# Patient Record
Sex: FEMALE | Employment: FULL TIME | URBAN - METROPOLITAN AREA
[De-identification: names, ages, dates, MRNs, and addresses within clinical notes are randomized per-mention and may not be internally consistent; named-entity substitution may affect disease eponyms.]

---

## 2021-04-26 ENCOUNTER — HOSPITAL ENCOUNTER (OUTPATIENT)
Dept: PHYSICAL THERAPY | Age: 31
Discharge: HOME | End: 2021-04-26
Payer: COMMERCIAL

## 2021-04-26 PROCEDURE — 97161 PT EVAL LOW COMPLEX 20 MIN: CPT | Performed by: PHYSICAL THERAPIST

## 2021-04-26 PROCEDURE — 97140 MANUAL THERAPY 1/> REGIONS: CPT | Performed by: PHYSICAL THERAPIST

## 2021-04-26 PROCEDURE — 97112 NEUROMUSCULAR REEDUCATION: CPT | Performed by: PHYSICAL THERAPIST
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
## 2021-05-04 ENCOUNTER — HOSPITAL ENCOUNTER (OUTPATIENT)
Dept: PHYSICAL THERAPY | Age: 31
Discharge: HOME | End: 2021-05-04
Payer: COMMERCIAL

## 2021-05-04 PROCEDURE — 97140 MANUAL THERAPY 1/> REGIONS: CPT | Performed by: PHYSICAL THERAPIST

## 2021-05-04 PROCEDURE — 97112 NEUROMUSCULAR REEDUCATION: CPT | Performed by: PHYSICAL THERAPIST

## 2021-05-04 NOTE — PROGRESS NOTES
PT DAILY TREATMENT NOTE - [facility] 2-15    Patient Name: Yury Jose  Date:2021  : 1990  [x]  Patient  Verified  Payor: Tabby Fuelling / Plan: [address] [place] [address] / Product Type: PPO /    In time:2:40 PM  Out time:340 PM  Total Treatment Time (min): 60  Total Timed Codes (min): 45  1:1 Treatment Time ( only): 45   Visit #:  2    Treatment Area: Headache with orthostatic component, not elsewhere classified [R51.0]    SUBJECTIVE  Pain Level (0-10 scale): 5-6/10  Any medication changes, allergies to medications, adverse drug reactions, diagnosis change, or new procedure performed?: [x] No    [] Yes (see summary sheet for update)  Subjective functional status/changes:   [] No changes reported  Pt reports exercise has been hard    OBJECTIVE    Modality rationale: decrease pain and increase tissue extensibility to improve the patients ability to sit, stand, transfer, ambulate, lift, carry, reach, complete ADLs   Min Type Additional Details       [] Estim: []Att   []Unatt    []TENS instruct                  []IFC  []Premod   []NMES                     []Other:  []w/US   []w/ice   []w/heat  Position:  Location:       []  Traction: [] Cervical       []Lumbar                       [] Prone          []Supine                       []Intermittent   []Continuous Lbs:  [] before manual  [] after manual  []w/heat    []  Ultrasound: []Continuous   [] Pulsed                       at: []1MHz   []3MHz Location:  W/cm2:    [] Paraffin         Location:   []w/heat   15 []  Ice     [x]  Heat  []  Ice massage Position: supine  Location: neck, back and chest    []  Laser  []  Other: Position:  Location:      []  Vasopneumatic Device Pressure:       [] lo [] med [] hi   Temperature:      [x] Skin assessment post-treatment:  [x]intact []redness- no adverse reaction    []redness  adverse reaction:     30 min Neuromuscular Re-education:  [x]  See flow sheet :   Rationale: improve coordination, improve balance and increase proprioception  to improve the patients ability to sit, stand, transfer, ambulate, lift, carry, reach, complete ADLs    15 min Manual Therapy: 2 person rib pumping, infraclavicular pumping    Rationale: decrease pain, increase ROM, increase tissue extensibility and decrease trigger points to improve the patients ability to sit, stand, transfer, ambulate, lift, carry, reach, complete ADLs         With   [] TE   [] TA   [x] Neuro   [] SC   [] other: Patient Education: [x] Review HEP    [] Progressed/Changed HEP based on:   [x] positioning   [x] body mechanics   [] transfers   [x] heat/ice application    [] other:      Other Objective/Functional Measures:   Difficulty disengaging rectus abdominus during today's interventions     Pain Level (0-10 scale) post treatment: 0    ASSESSMENT/Changes in Function:     Patient will continue to benefit from skilled PT services to modify and progress therapeutic interventions, address functional mobility deficits, address ROM deficits, address strength deficits, analyze and address soft tissue restrictions, analyze and cue movement patterns, analyze and modify body mechanics/ergonomics, assess and modify postural abnormalities, address imbalance/dizziness and instruct in home and community integration to attain remaining goals. []  See Plan of Care  []  See progress note/recertification  []  See Discharge Summary         Progress towards goals / Updated goals:  Patient continues to require verbal cues to complete exercises with correct form and postural awareness. Patient was able to advance several exercises this visit and is progressing well towards goals.     PLAN  [x]  Upgrade activities as tolerated     [x]  Continue plan of care  [x]  Update interventions per flow sheet       []  Discharge due to:_  []  Other:_      Jovany Altamirano, PT 5/4/2021

## 2021-05-11 ENCOUNTER — HOSPITAL ENCOUNTER (OUTPATIENT)
Dept: PHYSICAL THERAPY | Age: 31
Discharge: HOME | End: 2021-05-11
Payer: COMMERCIAL

## 2021-05-11 PROCEDURE — 97112 NEUROMUSCULAR REEDUCATION: CPT | Performed by: PHYSICAL THERAPIST

## 2021-05-11 NOTE — PROGRESS NOTES
PT DAILY TREATMENT NOTE - [facility] 2-15    Patient Name: Mike Choe  Date:2021  : 1990  [x]  Patient  Verified  Payor: AZEEM [place] / Plan: [address] [place] [address] / Product Type: PPO /    In time:9:00 AM Out time: 1015 AM  Total Treatment Time (min): 75  Total Timed Codes (min): 60  1:1 Treatment Time ( only): 60  Visit #:  3    Treatment Area: Headache with orthostatic component, not elsewhere classified [R51.0]    SUBJECTIVE  Pain Level (0-10 scale): 5/10  Any medication changes, allergies to medications, adverse drug reactions, diagnosis change, or new procedure performed?: [x] No    [] Yes (see summary sheet for update)  Subjective functional status/changes:   [] No changes reported  Pt reports she is no better and no worse but she is having an easier time breathing correctly    OBJECTIVE    Modality rationale: decrease pain and increase tissue extensibility to improve the patients ability to sit, stand, transfer, ambulate, lift, carry, reach, complete ADLs   Min Type Additional Details       [] Estim: []Att   []Unatt    []TENS instruct                  []IFC  []Premod   []NMES                     []Other:  []w/US   []w/ice   []w/heat  Position:  Location:       []  Traction: [] Cervical       []Lumbar                       [] Prone          []Supine                       []Intermittent   []Continuous Lbs:  [] before manual  [] after manual  []w/heat    []  Ultrasound: []Continuous   [] Pulsed                       at: []1MHz   []3MHz Location:  W/cm2:    [] Paraffin         Location:   []w/heat   15 []  Ice     [x]  Heat  []  Ice massage Position: supine  Location: neck, back and chest    []  Laser  []  Other: Position:  Location:      []  Vasopneumatic Device Pressure:       [] lo [] med [] hi   Temperature:      [x] Skin assessment post-treatment:  [x]intact []redness- no adverse reaction    []redness  adverse reaction:     60 min Neuromuscular Re-education:  [x]  See flow sheet : Rationale: improve coordination, improve balance and increase proprioception  to improve the patients ability to sit, stand, transfer, ambulate, lift, carry, reach, complete ADLs    0 min Manual Therapy: 2 person rib pumping, infraclavicular pumping    Rationale: decrease pain, increase ROM, increase tissue extensibility and decrease trigger points to improve the patients ability to sit, stand, transfer, ambulate, lift, carry, reach, complete ADLs         With   [] TE   [] TA   [x] Neuro   [] SC   [] other: Patient Education: [x] Review HEP    [] Progressed/Changed HEP based on:   [x] positioning   [x] body mechanics   [] transfers   [x] heat/ice application    [] other:      Other Objective/Functional Measures:   Slight increase in pain with inhalation with certain activities     Pain Level (0-10 scale) post treatment: 0    ASSESSMENT/Changes in Function:     Patient will continue to benefit from skilled PT services to modify and progress therapeutic interventions, address functional mobility deficits, address ROM deficits, address strength deficits, analyze and address soft tissue restrictions, analyze and cue movement patterns, analyze and modify body mechanics/ergonomics, assess and modify postural abnormalities, address imbalance/dizziness and instruct in home and community integration to attain remaining goals.      []  See Plan of Care  []  See progress note/recertification  []  See Discharge Summary         Progress towards goals / Updated goals:  Continued verbal and tactile cues needed to feel and sense IO/TA    PLAN  [x]  Upgrade activities as tolerated     [x]  Continue plan of care  [x]  Update interventions per flow sheet       []  Discharge due to:_  []  Other:_      Ever Killer, PT 5/11/2021

## 2021-05-18 ENCOUNTER — HOSPITAL ENCOUNTER (OUTPATIENT)
Dept: PHYSICAL THERAPY | Age: 31
Discharge: HOME | End: 2021-05-18
Payer: COMMERCIAL

## 2021-05-18 PROCEDURE — 97112 NEUROMUSCULAR REEDUCATION: CPT | Performed by: PHYSICAL THERAPIST

## 2021-05-18 NOTE — PROGRESS NOTES
PT DAILY TREATMENT NOTE - [facility] 2-15    Patient Name: Cody Bello  Date:2021  : 1990  [x]  Patient  Verified  Payor: AZEEM NHUNG / Plan: [address] [place] [address] / Product Type: PPO /    In time:9:00 AM Out time: 1015 AM  Total Treatment Time (min): 75  Total Timed Codes (min): 60  1:1 Treatment Time ( only): 60  Visit #:  4    Treatment Area: Headache with orthostatic component, not elsewhere classified [R51.0]    SUBJECTIVE  Pain Level (0-10 scale): 5/10  Any medication changes, allergies to medications, adverse drug reactions, diagnosis change, or new procedure performed?: [x] No    [] Yes (see summary sheet for update)  Subjective functional status/changes:   [] No changes reported  Pt reports she has been busy the last few days 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[employer] and has had limited time to perform HEP    OBJECTIVE    Modality rationale: decrease pain and increase tissue extensibility to improve the patients ability to sit, stand, transfer, ambulate, lift, carry, reach, complete ADLs   Min Type Additional Details       [] Estim: []Att   []Unatt    []TENS instruct                  []IFC  []Premod   []NMES                     []Other:  []w/US   []w/ice   []w/heat  Position:  Location:       []  Traction: [] Cervical       []Lumbar                       [] Prone          []Supine                       []Intermittent   []Continuous Lbs:  [] before manual  [] after manual  []w/heat    []  Ultrasound: []Continuous   [] Pulsed                       at: []1MHz   []3MHz Location:  W/cm2:    [] Paraffin         Location:   []w/heat   15 []  Ice     [x]  Heat  []  Ice massage Position: supine  Location: neck, back and chest    []  Laser  []  Other: Position:  Location:      []  Vasopneumatic Device Pressure:       [] lo [] med [] hi   Temperature:      [x] Skin assessment post-treatment:  [x]intact []redness- no adverse reaction    []redness  adverse reaction:     45 min Neuromuscular Re-education:  [x] See flow sheet :   Rationale: improve coordination, improve balance and increase proprioception  to improve the patients ability to sit, stand, transfer, ambulate, lift, carry, reach, complete ADLs    0 min Manual Therapy: 2 person rib pumping, infraclavicular pumping    Rationale: decrease pain, increase ROM, increase tissue extensibility and decrease trigger points to improve the patients ability to sit, stand, transfer, ambulate, lift, carry, reach, complete ADLs         With   [] TE   [] TA   [x] Neuro   [] SC   [] other: Patient Education: [x] Review HEP    [] Progressed/Changed HEP based on:   [x] positioning   [x] body mechanics   [] transfers   [x] heat/ice application    [] other:      Other Objective/Functional Measures:   No increase in pain with today's interventions     Pain Level (0-10 scale) post treatment: 0    ASSESSMENT/Changes in Function:     Patient will continue to benefit from skilled PT services to modify and progress therapeutic interventions, address functional mobility deficits, address ROM deficits, address strength deficits, analyze and address soft tissue restrictions, analyze and cue movement patterns, analyze and modify body mechanics/ergonomics, assess and modify postural abnormalities, address imbalance/dizziness and instruct in home and community integration to attain remaining goals. []  See Plan of Care  []  See progress note/recertification  []  See Discharge Summary         Progress towards goals / Updated goals:  Advanced HEP.     PLAN  [x]  Upgrade activities as tolerated     [x]  Continue plan of care  [x]  Update interventions per flow sheet       []  Discharge due to:_  []  Other:_      Maryuri Bob, PT 5/18/2021

## 2021-05-25 ENCOUNTER — HOSPITAL ENCOUNTER (OUTPATIENT)
Dept: PHYSICAL THERAPY | Age: 31
Discharge: HOME | End: 2021-05-25
Payer: COMMERCIAL

## 2021-05-25 PROCEDURE — 97112 NEUROMUSCULAR REEDUCATION: CPT | Performed by: PHYSICAL THERAPIST

## 2021-05-25 NOTE — PROGRESS NOTES
Physical Therapy at [facility],   a part of [address]  [place] [facility], [address]  Phone: [phone]      Fax:  [phone]    Progress Note    Name: Malik Carmona   : 1990   MD: Kavin Vuong MD       Treatment Diagnosis: Headache with orthostatic component, not elsewhere classified [R51.0]  Start of Care: 21    Visits from Start of Care: 5  Missed Visits: 0    Summary of Care: Therapeutic Exercise,  Manual Therapy, Neuromuscular Re-education, Patient Education, Home Exercise Program         Assessment / Recommendations: The patient has completed 5 PT visits and has met 2/3 short term goals. The patient's progress has been slow due to stress of finishing her school semester. The patient would benefit from continued PT 1x/week for up to 8 additional weeks to improve breathing mechanics, restore strength without overstressing rectus abdominus, diaphragm or intercostals, and improve sensory awareness of position to reduce joint stress and pain. Short Term Goals: To be accomplished in 4 weeks:  1) The patient will be independent with introductory HEP - MET  2) The patient will demonstrate negative PADT to indicate neutral pelvic position - NOT MET  3) The patient will report ability to sit at her desk for >1 hour without a significant increase in symptoms - MET  Long Term Goals:  To be accomplished in 12 weeks:  1) The patient will demonstrate L3 squat or greater to improve ease with household chores - NOT MET  2) The patient will demonstrate 90 deg shoulder IR PROM B to indicate improved scapular-thoracic position- NOT MET  3) The patient will report ability to walk up and down inclines without an increase in pain for 30 minutes or greater - NOT MET    Drea Morrison, PT 2021

## 2021-06-01 ENCOUNTER — HOSPITAL ENCOUNTER (OUTPATIENT)
Dept: PHYSICAL THERAPY | Age: 31
Discharge: HOME | End: 2021-06-01
Payer: COMMERCIAL

## 2021-06-01 PROCEDURE — 97112 NEUROMUSCULAR REEDUCATION: CPT | Performed by: PHYSICAL THERAPIST

## 2021-06-01 PROCEDURE — 97140 MANUAL THERAPY 1/> REGIONS: CPT | Performed by: PHYSICAL THERAPIST

## 2021-06-01 NOTE — PROGRESS NOTES
PT DAILY TREATMENT NOTE - [facility] 2-15    Patient Name: Ross Montes De Oca  Date:2021  : 1990  [x]  Patient  Verified  Payor: BLUE CROSS / Plan: [address] [place] [address] / Product Type: PPO /    In time: 355 PM Out time: 4:55 PM  Total Treatment Time (min): 55  Total Timed Codes (min): 45  1:1 Treatment Time ( only): 40  Visit #:  6    Treatment Area: Headache with orthostatic component, not elsewhere classified [R51.0]    SUBJECTIVE  Pain Level (0-10 scale): 5/10  Any medication changes, allergies to medications, adverse drug reactions, diagnosis change, or new procedure performed?: [x] No    [] Yes (see summary sheet for update)  Subjective functional status/changes:   [] No changes reported  Pt reports her headaches have been worse and she doesn't know why    OBJECTIVE    Modality rationale: decrease pain and increase tissue extensibility to improve the patients ability to sit, stand, transfer, ambulate, lift, carry, reach, complete ADLs   Min Type Additional Details       [] Estim: []Att   []Unatt    []TENS instruct                  []IFC  []Premod   []NMES                     []Other:  []w/US   []w/ice   []w/heat  Position:  Location:       []  Traction: [] Cervical       []Lumbar                       [] Prone          []Supine                       []Intermittent   []Continuous Lbs:  [] before manual  [] after manual  []w/heat    []  Ultrasound: []Continuous   [] Pulsed                       at: []1MHz   []3MHz Location:  W/cm2:    [] Paraffin         Location:   []w/heat   15 []  Ice     [x]  Heat  []  Ice massage Position: supine  Location: neck, back and chest    []  Laser  []  Other: Position:  Location:      []  Vasopneumatic Device Pressure:       [] lo [] med [] hi   Temperature:      [x] Skin assessment post-treatment:  [x]intact []redness- no adverse reaction    []redness  adverse reaction:     30 min Neuromuscular Re-education:  [x]  See flow sheet :   Rationale: improve coordination, improve balance and increase proprioception  to improve the patients ability to sit, stand, transfer, ambulate, lift, carry, reach, complete ADLs    15 min Manual Therapy:  rib pumping, infraclavicular pumping, L AIC technique    Rationale: decrease pain, increase ROM, increase tissue extensibility and decrease trigger points to improve the patients ability to sit, stand, transfer, ambulate, lift, carry, reach, complete ADLs         With   [] TE   [] TA   [x] Neuro   [] SC   [] other: Patient Education: [x] Review HEP    [] Progressed/Changed HEP based on:   [x] positioning   [x] body mechanics   [] transfers   [x] heat/ice application    [] other:      Other Objective/Functional Measures:   Limited jaw excursion left  Pain relief with sacro sphenoid activity    Pain Level (0-10 scale) post treatment: 0    ASSESSMENT/Changes in Function:     Patient will continue to benefit from skilled PT services to modify and progress therapeutic interventions, address functional mobility deficits, address ROM deficits, address strength deficits, analyze and address soft tissue restrictions, analyze and cue movement patterns, analyze and modify body mechanics/ergonomics, assess and modify postural abnormalities, address imbalance/dizziness and instruct in home and community integration to attain remaining goals.      []  See Plan of Care  [x]  See progress note/recertification  []  See Discharge Summary         Progress towards goals / Updated goals:  Discontinued balloon due to increase in HA pain  Ecouraged using towel roll and pillow to keep neck relax for improve cervical spine relaxation during activities  PLAN  [x]  Upgrade activities as tolerated     [x]  Continue plan of care  [x]  Update interventions per flow sheet       []  Discharge due to:_  []  Other:_      Nolan Potts, PT 6/1/2021

## 2021-06-14 ENCOUNTER — APPOINTMENT (OUTPATIENT)
Dept: PHYSICAL THERAPY | Age: 31
End: 2021-06-14
Payer: COMMERCIAL

## 2021-06-15 ENCOUNTER — APPOINTMENT (OUTPATIENT)
Dept: PHYSICAL THERAPY | Age: 31
End: 2021-06-15
Payer: COMMERCIAL

## 2021-06-22 ENCOUNTER — HOSPITAL ENCOUNTER (OUTPATIENT)
Dept: PHYSICAL THERAPY | Age: 31
Discharge: HOME | End: 2021-06-22
Payer: COMMERCIAL

## 2021-06-22 PROCEDURE — 97112 NEUROMUSCULAR REEDUCATION: CPT | Performed by: PHYSICAL THERAPIST

## 2021-06-22 PROCEDURE — 97140 MANUAL THERAPY 1/> REGIONS: CPT | Performed by: PHYSICAL THERAPIST

## 2021-06-22 NOTE — PROGRESS NOTES
PT DAILY TREATMENT NOTE - [facility] 2-15    Patient Name: Mckenzie Pinzon  Date:2021  : 1990  [x]  Patient  Verified  Payor: BLUE CROSS / Plan: [address] [place] [address] / Product Type: PPO /    In time: 100 PM Out time: 210 PM  Total Treatment Time (min): 70  Total Timed Codes (min): 60  1:1 Treatment Time ( only): 60  Visit #:  7    Treatment Area: Headache with orthostatic component, not elsewhere classified [R51.0]    SUBJECTIVE  Pain Level (0-10 scale): 4-5/10  Any medication changes, allergies to medications, adverse drug reactions, diagnosis change, or new procedure performed?: [x] No    [] Yes (see summary sheet for update)  Subjective functional status/changes:   [] No changes reported  Pt reports on  she went on a 4 mile walk and she paid for it   [employer] down and icing help\"  \"A normal walk at this time is just around the block 15-20 min\"      OBJECTIVE    Modality rationale: decrease pain and increase tissue extensibility to improve the patients ability to sit, stand, transfer, ambulate, lift, carry, reach, complete ADLs   Min Type Additional Details       [] Estim: []Att   []Unatt    []TENS instruct                  []IFC  []Premod   []NMES                     []Other:  []w/US   []w/ice   []w/heat  Position:  Location:       []  Traction: [] Cervical       []Lumbar                       [] Prone          []Supine                       []Intermittent   []Continuous Lbs:  [] before manual  [] after manual  []w/heat    []  Ultrasound: []Continuous   [] Pulsed                       at: []1MHz   []3MHz Location:  W/cm2:    [] Paraffin         Location:   []w/heat   10 []  Ice     [x]  Heat  []  Ice massage Position: supine  Location: neck, back and chest    []  Laser  []  Other: Position:  Location:      []  Vasopneumatic Device Pressure:       [] lo [] med [] hi   Temperature:      [x] Skin assessment post-treatment:  [x]intact []redness- no adverse reaction    []redness  adverse reaction:     45 min Neuromuscular Re-education:  [x]  See flow sheet :   Rationale: improve coordination, improve balance and increase proprioception  to improve the patients ability to sit, stand, transfer, ambulate, lift, carry, reach, complete ADLs    15 min Manual Therapy:  rib pumping, infraclavicular pumping, L AIC technique    Rationale: decrease pain, increase ROM, increase tissue extensibility and decrease trigger points to improve the patients ability to sit, stand, transfer, ambulate, lift, carry, reach, complete ADLs         With   [] TE   [] TA   [x] Neuro   [] SC   [] other: Patient Education: [x] Review HEP    [] Progressed/Changed HEP based on:   [x] positioning   [x] body mechanics   [] transfers   [x] heat/ice application    [] other:      Other Objective/Functional Measures:   HG IR 45 deg B  HG flex 170  HAdDT -R  Post med exp limited B  HAdDT - B  SLR 90 deg  LTR WNL    Pain Level (0-10 scale) post treatment: 0    ASSESSMENT/Changes in Function:     Patient will continue to benefit from skilled PT services to modify and progress therapeutic interventions, address functional mobility deficits, address ROM deficits, address strength deficits, analyze and address soft tissue restrictions, analyze and cue movement patterns, analyze and modify body mechanics/ergonomics, assess and modify postural abnormalities, address imbalance/dizziness and instruct in home and community integration to attain remaining goals. []  See Plan of Care  []  See progress note/recertification  []  See Discharge Summary         Progress towards goals / Updated goals:  BC chain inhibited with today's interventions. Significant work to address tongue position.     PLAN   [x]  Upgrade activities as tolerated     [x]  Continue plan of care  [x]  Update interventions per flow sheet       []  Discharge due to:_  []  Other:_      Nisha Found, PT 6/22/2021

## 2021-06-29 ENCOUNTER — HOSPITAL ENCOUNTER (OUTPATIENT)
Dept: PHYSICAL THERAPY | Age: 31
Discharge: HOME | End: 2021-06-29
Payer: COMMERCIAL

## 2021-06-29 PROCEDURE — 97112 NEUROMUSCULAR REEDUCATION: CPT | Performed by: PHYSICAL THERAPIST

## 2021-06-29 PROCEDURE — 97140 MANUAL THERAPY 1/> REGIONS: CPT | Performed by: PHYSICAL THERAPIST

## 2021-06-29 NOTE — PROGRESS NOTES
PT DAILY TREATMENT NOTE - [facility] 2-15    Patient Name: Palmira Martinez  Date:2021  : 1990  [x]  Patient  Verified  Payor: BLUE CROSS / Plan: [address] [place] [address] / Product Type: PPO /    In time: 5121 PM Out time: 155 PM  Total Treatment Time (min): 80  Total Timed Codes (min): 65  1:1 Treatment Time ( only): 72  Visit #:  8    Treatment Area: Headache with orthostatic component, not elsewhere classified [R51.0]    SUBJECTIVE  Pain Level (0-10 scale): 4-5/10  Any medication changes, allergies to medications, adverse drug reactions, diagnosis change, or new procedure performed?: [x] No    [] Yes (see summary sheet for update)  Subjective functional status/changes:   [] No changes reported  Pt reports she had back neck pain for 4 days and she doesn't know why (L sided)      OBJECTIVE    Modality rationale: decrease pain and increase tissue extensibility to improve the patients ability to sit, stand, transfer, ambulate, lift, carry, reach, complete ADLs   Min Type Additional Details       [] Estim: []Att   []Unatt    []TENS instruct                  []IFC  []Premod   []NMES                     []Other:  []w/US   []w/ice   []w/heat  Position:  Location:       []  Traction: [] Cervical       []Lumbar                       [] Prone          []Supine                       []Intermittent   []Continuous Lbs:  [] before manual  [] after manual  []w/heat    []  Ultrasound: []Continuous   [] Pulsed                       at: []1MHz   []3MHz Location:  W/cm2:    [] Paraffin         Location:   []w/heat   15 []  Ice     [x]  Heat  []  Ice massage Position: supine  Location: neck, back and chest    []  Laser  []  Other: Position:  Location:      []  Vasopneumatic Device Pressure:       [] lo [] med [] hi   Temperature:      [x] Skin assessment post-treatment:  [x]intact []redness- no adverse reaction    []redness  adverse reaction:     30 min Neuromuscular Re-education:  [x]  See flow sheet :   Rationale: improve coordination, improve balance and increase proprioception  to improve the patients ability to sit, stand, transfer, ambulate, lift, carry, reach, complete ADLs    30 min Manual Therapy:  rib pumping, infraclavicular pumping, L AIC technique, 3 person central diaphragm release, MFR to diaphragm and rectus abs    Rationale: decrease pain, increase ROM, increase tissue extensibility and decrease trigger points to improve the patients ability to sit, stand, transfer, ambulate, lift, carry, reach, complete ADLs         With   [] TE   [] TA   [x] Neuro   [] SC   [] other: Patient Education: [x] Review HEP    [] Progressed/Changed HEP based on:   [x] positioning   [x] body mechanics   [] transfers   [x] heat/ice application    [] other:      Other Objective/Functional Measures:   Pt able to hold all 4 pos without an increase in neck pain    Pain Level (0-10 scale) post treatment: 0    ASSESSMENT/Changes in Function:     Patient will continue to benefit from skilled PT services to modify and progress therapeutic interventions, address functional mobility deficits, address ROM deficits, address strength deficits, analyze and address soft tissue restrictions, analyze and cue movement patterns, analyze and modify body mechanics/ergonomics, assess and modify postural abnormalities, address imbalance/dizziness and instruct in home and community integration to attain remaining goals.      []  See Plan of Care  []  See progress note/recertification  []  See Discharge Summary         Progress towards goals / Updated goals:  Improved tolerance of quadruped position    PLAN   [x]  Upgrade activities as tolerated     [x]  Continue plan of care  [x]  Update interventions per flow sheet       []  Discharge due to:_  []  Other:_      Amada Gaxiola, PT 6/29/2021

## 2021-07-08 ENCOUNTER — HOSPITAL ENCOUNTER (OUTPATIENT)
Dept: PHYSICAL THERAPY | Age: 31
Discharge: HOME | End: 2021-07-08
Payer: COMMERCIAL

## 2021-07-08 PROCEDURE — 97112 NEUROMUSCULAR REEDUCATION: CPT | Performed by: PHYSICAL THERAPIST

## 2021-07-08 PROCEDURE — 97140 MANUAL THERAPY 1/> REGIONS: CPT | Performed by: PHYSICAL THERAPIST

## 2021-07-08 NOTE — PROGRESS NOTES
PT DAILY TREATMENT NOTE - [facility] 2-15    Patient Name: Porter Harris  Date:2021  : 1990  [x]  Patient  Verified  Payor: BLUE CROSS / Plan: [address] [place] [address] / Product Type: PPO /    In time: 230 PM Out time: 445 PM  Total Treatment Time (min): 75  Total Timed Codes (min): 60  1:1 Treatment Time (MC only): 60  Visit #:  9    Treatment Area: Headache with orthostatic component, not elsewhere classified [R51.0]    SUBJECTIVE  Pain Level (0-10 scale): 5/10  Any medication changes, allergies to medications, adverse drug reactions, diagnosis change, or new procedure performed?: [x] No    [] Yes (see summary sheet for update)  Subjective functional status/changes:   [] No changes reported  Pt reports she is having trouble with the downward dog   Pt reports she has been under more stress from work since one person quit  Pt reports headaches have remained unchanged  Pt reports she has some L sided neck tension after sacrospheno flexion  Pt reports she is scheduled to try cupping and abdominal massage in the next 2 weeks        OBJECTIVE    Modality rationale: decrease pain and increase tissue extensibility to improve the patients ability to sit, stand, transfer, ambulate, lift, carry, reach, complete ADLs   Min Type Additional Details       [] Estim: []Att   []Unatt    []TENS instruct                  []IFC  []Premod   []NMES                     []Other:  []w/US   []w/ice   []w/heat  Position:  Location:       []  Traction: [] Cervical       []Lumbar                       [] Prone          []Supine                       []Intermittent   []Continuous Lbs:  [] before manual  [] after manual  []w/heat    []  Ultrasound: []Continuous   [] Pulsed                       at: []1MHz   []3MHz Location:  W/cm2:    [] Paraffin         Location:   []w/heat   15 []  Ice     [x]  Heat  []  Ice massage Position: supine  Location: neck, back and chest    []  Laser  []  Other: Position:  Location:      [] Vasopneumatic Device Pressure:       [] lo [] med [] hi   Temperature:      [x] Skin assessment post-treatment:  [x]intact []redness- no adverse reaction    []redness  adverse reaction:     30 min Neuromuscular Re-education:  [x]  See flow sheet :   Rationale: improve coordination, improve balance and increase proprioception  to improve the patients ability to sit, stand, transfer, ambulate, lift, carry, reach, complete ADLs    30 min Manual Therapy:  rib pumping, infraclavicular pumping, L AIC technique, 2 person central diaphragm release, MFR to diaphragm and rectus abs (L), MFR to R QL and QL stretching   Rationale: decrease pain, increase ROM, increase tissue extensibility and decrease trigger points to improve the patients ability to sit, stand, transfer, ambulate, lift, carry, reach, complete ADLs         With   [] TE   [] TA   [x] Neuro   [] SC   [] other: Patient Education: [x] Review HEP    [] Progressed/Changed HEP based on:   [x] positioning   [x] body mechanics   [] transfers   [x] heat/ice application    [] other:      Other Objective/Functional Measures:   Pt with excessive cervical extension with sacro spheno flexion so verbal cues were given to perform activity with correct form. Pain with standing glute push and standing apical expansion exercise  No pain with Late LAIC stance with R arm reach activity    Pain Level (0-10 scale) post treatment: 0    ASSESSMENT/Changes in Function:     Patient will continue to benefit from skilled PT services to modify and progress therapeutic interventions, address functional mobility deficits, address ROM deficits, address strength deficits, analyze and address soft tissue restrictions, analyze and cue movement patterns, analyze and modify body mechanics/ergonomics, assess and modify postural abnormalities, address imbalance/dizziness and instruct in home and community integration to attain remaining goals.      []  See Plan of Care  []  See progress note/recertification  []  See Discharge Summary         Progress towards goals / Updated goals:  Adjusted HEP due to painful downward dog. Assigned new jaw activities and new R QL stretch and instructed in taping lips at night.     PLAN   [x]  Upgrade activities as tolerated     [x]  Continue plan of care  [x]  Update interventions per flow sheet       []  Discharge due to:_  []  Other:_      Rayray Gurrola, PT 7/8/2021

## 2021-07-13 ENCOUNTER — HOSPITAL ENCOUNTER (OUTPATIENT)
Dept: PHYSICAL THERAPY | Age: 31
Discharge: HOME | End: 2021-07-13
Payer: COMMERCIAL

## 2021-07-13 PROCEDURE — 97112 NEUROMUSCULAR REEDUCATION: CPT | Performed by: PHYSICAL THERAPIST

## 2021-07-13 PROCEDURE — 97140 MANUAL THERAPY 1/> REGIONS: CPT | Performed by: PHYSICAL THERAPIST

## 2021-07-13 NOTE — PROGRESS NOTES
PT DAILY TREATMENT NOTE - [facility] 2-15    Patient Name: Luis Mcnamara  Date:2021  : 1990  [x]  Patient  Verified  Payor: AZEEM NHUNG / Plan: [address] [place] [address] / Product Type: PPO /    In time: 1135 AM Out time: 1250 AM  Total Treatment Time (min): 75  Total Timed Codes (min): 60  1:1 Treatment Time ( only): 55  Visit #:  10    Treatment Area: Headache with orthostatic component, not elsewhere classified [R51.0]    SUBJECTIVE  Pain Level (0-10 scale): 5/10  Any medication changes, allergies to medications, adverse drug reactions, diagnosis change, or new procedure performed?: [x] No    [] Yes (see summary sheet for update)  Subjective functional status/changes:   [] No changes reported  Pt reports she stubbed her R pinky toe on Saturday on her bed. X-rays revealed no fracture.   PT reports compliance with HEP      OBJECTIVE    Modality rationale: decrease pain and increase tissue extensibility to improve the patients ability to sit, stand, transfer, ambulate, lift, carry, reach, complete ADLs   Min Type Additional Details       [] Estim: []Att   []Unatt    []TENS instruct                  []IFC  []Premod   []NMES                     []Other:  []w/US   []w/ice   []w/heat  Position:  Location:       []  Traction: [] Cervical       []Lumbar                       [] Prone          []Supine                       []Intermittent   []Continuous Lbs:  [] before manual  [] after manual  []w/heat    []  Ultrasound: []Continuous   [] Pulsed                       at: []1MHz   []3MHz Location:  W/cm2:    [] Paraffin         Location:   []w/heat   15 []  Ice     [x]  Heat  []  Ice massage Position: supine  Location: neck, back and chest    []  Laser  []  Other: Position:  Location:      []  Vasopneumatic Device Pressure:       [] lo [] med [] hi   Temperature:      [x] Skin assessment post-treatment:  [x]intact []redness- no adverse reaction    []redness  adverse reaction:     30 min Neuromuscular Re-education:  [x]  See flow sheet :   Rationale: improve coordination, improve balance and increase proprioception  to improve the patients ability to sit, stand, transfer, ambulate, lift, carry, reach, complete ADLs    30 min Manual Therapy:  rib pumping, infraclavicular pumping, L AIC technique, 2 person central diaphragm release, MFR to diaphragm and rectus abs (L), MFR to R QL and QL stretching. Rationale: decrease pain, increase ROM, increase tissue extensibility and decrease trigger points to improve the patients ability to sit, stand, transfer, ambulate, lift, carry, reach, complete ADLs         With   [] TE   [] TA   [x] Neuro   [] SC   [] other: Patient Education: [x] Review HEP    [] Progressed/Changed HEP based on:   [x] positioning   [x] body mechanics   [] transfers   [x] heat/ice application    [] other:      Other Objective/Functional Measures:   Pain with R lat stretch and R lat hang so this exercise was not assigned for HEP    Late L AIC stance performed on pillows for increased sensation through feet. Pain Level (0-10 scale) post treatment: 0    ASSESSMENT/Changes in Function:     Patient will continue to benefit from skilled PT services to modify and progress therapeutic interventions, address functional mobility deficits, address ROM deficits, address strength deficits, analyze and address soft tissue restrictions, analyze and cue movement patterns, analyze and modify body mechanics/ergonomics, assess and modify postural abnormalities, address imbalance/dizziness and instruct in home and community integration to attain remaining goals. []  See Plan of Care  []  See progress note/recertification  []  See Discharge Summary         Progress towards goals / Updated goals:  Given new breathing activities and a vagus nerve educational sheet. Pt limited by R toe pain.     PLAN   [x]  Upgrade activities as tolerated     [x]  Continue plan of care  [x]  Update interventions per flow sheet []  Discharge due to:_  []  Other:_      Giulia Sepulveda, PT 7/13/2021

## 2021-07-20 ENCOUNTER — HOSPITAL ENCOUNTER (OUTPATIENT)
Dept: PHYSICAL THERAPY | Age: 31
Discharge: HOME | End: 2021-07-20
Payer: COMMERCIAL

## 2021-07-20 PROCEDURE — 97112 NEUROMUSCULAR REEDUCATION: CPT | Performed by: PHYSICAL THERAPIST

## 2021-07-20 PROCEDURE — 97140 MANUAL THERAPY 1/> REGIONS: CPT | Performed by: PHYSICAL THERAPIST

## 2021-07-20 NOTE — PROGRESS NOTES
PT DAILY TREATMENT NOTE - [facility] 2-15    Patient Name: Luis Mcnamara  Date:2021  : 1990  [x]  Patient  Verified  Payor: BLUE CROSS / Plan: [address] [place] [address] / Product Type: PPO /    In time: 240 PM Out time: 355 PM  Total Treatment Time (min): 75  Total Timed Codes (min): 60  1:1 Treatment Time ( only): 60  Visit #:  11    Treatment Area: Headache with orthostatic component, not elsewhere classified [R51.0]    SUBJECTIVE  Pain Level (0-10 scale): 5/10  Any medication changes, allergies to medications, adverse drug reactions, diagnosis change, or new procedure performed?: [x] No    [] Yes (see summary sheet for update)  Subjective functional status/changes:   [] No changes reported  Pt reports she had a cupping session \"I felt less restricted waking up\"  Pt has been recommended to go to [facility]   Pt feels fatigued after her PT session  Pt has had a stressful day she found out her dog has cancer    OBJECTIVE    Modality rationale: decrease pain and increase tissue extensibility to improve the patients ability to sit, stand, transfer, ambulate, lift, carry, reach, complete ADLs   Min Type Additional Details       [] Estim: []Att   []Unatt    []TENS instruct                  []IFC  []Premod   []NMES                     []Other:  []w/US   []w/ice   []w/heat  Position:  Location:       []  Traction: [] Cervical       []Lumbar                       [] Prone          []Supine                       []Intermittent   []Continuous Lbs:  [] before manual  [] after manual  []w/heat    []  Ultrasound: []Continuous   [] Pulsed                       at: []1MHz   []3MHz Location:  W/cm2:    [] Paraffin         Location:   []w/heat   15 []  Ice     [x]  Heat  []  Ice massage Position: supine  Location: neck, back and chest    []  Laser  []  Other: Position:  Location:      []  Vasopneumatic Device Pressure:       [] lo [] med [] hi   Temperature:      [x] Skin assessment post-treatment: [x]intact []redness- no adverse reaction    []redness  adverse reaction:     45 min Neuromuscular Re-education:  [x]  See flow sheet :   Rationale: improve coordination, improve balance and increase proprioception  to improve the patients ability to sit, stand, transfer, ambulate, lift, carry, reach, complete ADLs    15 min Manual Therapy:  rib pumping, infraclavicular pumping, L AIC technique, 2 person central diaphragm release, MFR to diaphragm and rectus abs (L), MFR to R QL and QL stretching. Rationale: decrease pain, increase ROM, increase tissue extensibility and decrease trigger points to improve the patients ability to sit, stand, transfer, ambulate, lift, carry, reach, complete ADLs         With   [] TE   [] TA   [x] Neuro   [] SC   [] other: Patient Education: [x] Review HEP    [] Progressed/Changed HEP based on:   [x] positioning   [x] body mechanics   [] transfers   [x] heat/ice application    [] other:      Other Objective/Functional Measures:   Difficulty disengaging recuts abs during exhale so hum was used instead, encouraged use of straw at home    Pain Level (0-10 scale) post treatment: 0    ASSESSMENT/Changes in Function:     Patient will continue to benefit from skilled PT services to modify and progress therapeutic interventions, address functional mobility deficits, address ROM deficits, address strength deficits, analyze and address soft tissue restrictions, analyze and cue movement patterns, analyze and modify body mechanics/ergonomics, assess and modify postural abnormalities, address imbalance/dizziness and instruct in home and community integration to attain remaining goals. []  See Plan of Care  []  See progress note/recertification  []  See Discharge Summary         Progress towards goals / Updated goals:  Used hum during activities for improved sense of L IO/TA. Added hyperboloid activity.     PLAN   [x]  Upgrade activities as tolerated     [x]  Continue plan of care  [x]  Update interventions per flow sheet       []  Discharge due to:_  []  Other:_      Francie Gilbert, PT 7/20/2021

## 2021-07-28 ENCOUNTER — HOSPITAL ENCOUNTER (OUTPATIENT)
Dept: PHYSICAL THERAPY | Age: 31
Discharge: HOME | End: 2021-07-28
Payer: COMMERCIAL

## 2021-07-28 PROCEDURE — 97535 SELF CARE MNGMENT TRAINING: CPT | Performed by: PHYSICAL THERAPIST

## 2021-07-28 PROCEDURE — 97140 MANUAL THERAPY 1/> REGIONS: CPT | Performed by: PHYSICAL THERAPIST

## 2021-07-28 PROCEDURE — 97112 NEUROMUSCULAR REEDUCATION: CPT | Performed by: PHYSICAL THERAPIST

## 2021-07-28 NOTE — PROGRESS NOTES
Physical Therapy at [facility],   a part of  [facility]  [address] [facility], [facility]  Phone: [phone]      Fax:  [phone]    Progress Note and Updated POC    Name: Prateek Fuentes   : 1990   MD: Los Powers MD       Treatment Diagnosis: Headache with orthostatic component, not elsewhere classified [R51.0]  Start of Care: 21    Visits from Start of Care: 12  Missed Visits: 0    Summary of Care: Therapeutic Exercise,  Manual Therapy, Neuromuscular Re-education, Patient Education, Home Exercise Program         Assessment / Recommendations: The patient has completed 12 PT visits and has met 2/3 short term goals and 1/3 long term goals. The patient continues to report 5/10 levels of pain in her neck and abdomen and continues to be limited by headaches. Pain fluctuates and is worsened with stress and increased physical exertion. The patient recently saw a chiropractor an experienced an increase in HA pain. The patient has tried massage and cupping with short term benefits. The patient reports improved pain with pursed lip slow breathing, opposed to forceful exhale. The patient would benefit from continued PT 1x/week for up to 8 additional weeks to improve breathing mechanics, restore strength without overstressing rectus abdominus, diaphragm or intercostals, and improve sensory awareness of position to reduce joint stress and pain. Short Term Goals: To be accomplished in 4 weeks:  1) The patient will be independent with introductory HEP - MET  2) The patient will demonstrate negative PADT to indicate neutral pelvic position - NOT MET  3) The patient will report ability to sit at her desk for >1 hour without a significant increase in symptoms - MET  Long Term Goals:  To be accomplished in 12 weeks: Update to 24 visits:  1) The patient will demonstrate L3 squat or greater to improve ease with household chores - NOT MET  2) The patient will demonstrate 90 deg shoulder IR PROM B to indicate improved scapular-thoracic position- MET  3) The patient will report ability to walk up and down inclines without an increase in pain for 30 minutes or greater - NOT MET  New Goal:  1) The patient will report ability to sit for 4 hours to improve ability to complete a 1/2 day of work    Jaqueline Owen, PT 7/28/2021

## 2021-07-28 NOTE — PROGRESS NOTES
PT DAILY TREATMENT NOTE - [facility] -15    Patient Name: Zeferino Argueta  Date:2021  : 1990  [x]  Patient  Verified  Payor: BLUE CROSS / Plan: [address] [place] [address] / Product Type: PPO /    In time: 11:05 AM  Out time: 12:00 PM  Total Treatment Time (min): 55  Total Timed Codes (min): 55  1:1 Treatment Time ( only): 54  Visit #:  12    Treatment Area: Headache with orthostatic component, not elsewhere classified [R51.0]    SUBJECTIVE  Pain Level (0-10 scale): 5/10  Any medication changes, allergies to medications, adverse drug reactions, diagnosis change, or new procedure performed?: [x] No    [] Yes (see summary sheet for update)  Subjective functional status/changes:   [] No changes reported  Pt reports she has been noticing with her jaw exercise, shifting to the right she has some L ear popping  Pt reports she tried to see the chiropractor and her neck was looser but the next day she had a bad headache    OBJECTIVE    Modality rationale: decrease pain and increase tissue extensibility to improve the patients ability to sit, stand, transfer, ambulate, lift, carry, reach, complete ADLs   Min Type Additional Details       [] Estim: []Att   []Unatt    []TENS instruct                  []IFC  []Premod   []NMES                     []Other:  []w/US   []w/ice   []w/heat  Position:  Location:       []  Traction: [] Cervical       []Lumbar                       [] Prone          []Supine                       []Intermittent   []Continuous Lbs:  [] before manual  [] after manual  []w/heat    []  Ultrasound: []Continuous   [] Pulsed                       at: []1MHz   []3MHz Location:  W/cm2:    [] Paraffin         Location:   []w/heat   10  during self care []  Ice     [x]  Heat  []  Ice massage Position: supine  Location: neck, back and chest    []  Laser  []  Other: Position:  Location:      []  Vasopneumatic Device Pressure:       [] lo [] med [] hi   Temperature:      [x] Skin assessment post-treatment: [x]intact []redness- no adverse reaction    []redness  adverse reaction:     25 min Neuromuscular Re-education:  [x]  See flow sheet :   Rationale: improve coordination, improve balance and increase proprioception  to improve the patients ability to sit, stand, transfer, ambulate, lift, carry, reach, complete ADLs    20 min Manual Therapy:  rib pumping, infraclavicular pumping, L AIC technique, 2 person central diaphragm release, MFR to diaphragm and rectus abs (L), MFR to R QL and QL stretching.    Rationale: decrease pain, increase ROM, increase tissue extensibility and decrease trigger points to improve the patients ability to sit, stand, transfer, ambulate, lift, carry, reach, complete ADLs    10 min Self Care/Home Management:  [x]  See flow sheet :   Rationale: improve coordination, improve balance and increase proprioception  to improve the patients ability to sit, stand, transfer, ambulate, lift, carry, reach, complete ADLs         With   [] TE   [] TA   [x] Neuro   [] SC   [] other: Patient Education: [x] Review HEP    [] Progressed/Changed HEP based on:   [x] positioning   [x] body mechanics   [] transfers   [x] heat/ice application    [] other:      Other Objective/Functional Measures:   Improved breathing tolerance using pursed lip exhale  Some increase in HA pain and pressure during hip flexor stretch, encourged pt to prop up with pillows   L hip flexor tightness    Pain Level (0-10 scale) post treatment: 0    ASSESSMENT/Changes in Function:     Patient will continue to benefit from skilled PT services to modify and progress therapeutic interventions, address functional mobility deficits, address ROM deficits, address strength deficits, analyze and address soft tissue restrictions, analyze and cue movement patterns, analyze and modify body mechanics/ergonomics, assess and modify postural abnormalities, address imbalance/dizziness and instruct in home and community integration to attain remaining goals.     []  See Plan of Care  []  See progress note/recertification  []  See Discharge Summary         Progress towards goals / Updated goals:  Given L pterygoid activity and R QL activity    PLAN   [x]  Upgrade activities as tolerated     [x]  Continue plan of care  [x]  Update interventions per flow sheet       []  Discharge due to:_  []  Other:_      Francie Gilbert, PT 7/28/2021

## 2021-08-03 ENCOUNTER — APPOINTMENT (OUTPATIENT)
Dept: PHYSICAL THERAPY | Age: 31
End: 2021-08-03
Payer: COMMERCIAL

## 2021-08-10 ENCOUNTER — HOSPITAL ENCOUNTER (OUTPATIENT)
Dept: PHYSICAL THERAPY | Age: 31
Discharge: HOME | End: 2021-08-10
Payer: COMMERCIAL

## 2021-08-10 PROCEDURE — 97112 NEUROMUSCULAR REEDUCATION: CPT | Performed by: PHYSICAL THERAPIST

## 2021-08-10 PROCEDURE — 97140 MANUAL THERAPY 1/> REGIONS: CPT | Performed by: PHYSICAL THERAPIST

## 2021-08-10 NOTE — PROGRESS NOTES
PT DAILY TREATMENT NOTE - [facility] 2-15    Patient Name: Anette Henson  Date:8/10/2021  : 1990  [x]  Patient  Verified  Payor: BLUE CROSS / Plan: [address] [place] [address] / Product Type: PPO /    In time: 11:30 AM  Out time: 1250 PM  Total Treatment Time (min): 80  Total Timed Codes (min): 60  1:1 Treatment Time ( only): 60  Visit #:  13    Treatment Area: Headache with orthostatic component, not elsewhere classified [R51.0]    SUBJECTIVE  Pain Level (0-10 scale): 5/10  Any medication changes, allergies to medications, adverse drug reactions, diagnosis change, or new procedure performed?: [x] No    [] Yes (see summary sheet for update)  Subjective functional status/changes:   [] No changes reported  Pt reports she is having trouble focusing due to stress   Pt saw the chiropractor again, Duane Xiao is going to try 2x/week for 3 weeks\" \"with the neck adustments my headache is worse\" \"i'm open to it but I'm not sure I want to continue\"  Pt have a cupping session tomorrow night \"I have been using the home unit and it gives me some relief\"  \"I used a cheap theragun last night\"    OBJECTIVE    Modality rationale: decrease pain and increase tissue extensibility to improve the patients ability to sit, stand, transfer, ambulate, lift, carry, reach, complete ADLs   Min Type Additional Details       [] Estim: []Att   []Unatt    []TENS instruct                  []IFC  []Premod   []NMES                     []Other:  []w/US   []w/ice   []w/heat  Position:  Location:       []  Traction: [] Cervical       []Lumbar                       [] Prone          []Supine                       []Intermittent   []Continuous Lbs:  [] before manual  [] after manual  []w/heat    []  Ultrasound: []Continuous   [] Pulsed                       at: []1MHz   []3MHz Location:  W/cm2:    [] Paraffin         Location:   []w/heat   15   []  Ice     [x]  Heat  []  Ice massage Position: supine  Location: neck, back and chest    []  Laser  []  Other: Position:  Location:      []  Vasopneumatic Device Pressure:       [] lo [] med [] hi   Temperature:      [x] Skin assessment post-treatment:  [x]intact []redness- no adverse reaction    []redness  adverse reaction:     30 min Neuromuscular Re-education:  [x]  See flow sheet :   Rationale: improve coordination, improve balance and increase proprioception  to improve the patients ability to sit, stand, transfer, ambulate, lift, carry, reach, complete ADLs    30 min Manual Therapy:  rib pumping, infraclavicular pumping, L AIC technique, 2 person central diaphragm release, MFR to diaphragm and rectus abs (L), MFR to R QL and QL stretching. Rationale: decrease pain, increase ROM, increase tissue extensibility and decrease trigger points to improve the patients ability to sit, stand, transfer, ambulate, lift, carry, reach, complete ADLs      With   [] TE   [] TA   [x] Neuro   [] SC   [] other: Patient Education: [x] Review HEP    [] Progressed/Changed HEP based on:   [x] positioning   [x] body mechanics   [] transfers   [x] heat/ice application    [] other:      Other Objective/Functional Measures:   BP in supine 110/78  BP in sitting 110/78  BP in standing 98/78    Squat L1    Difficulty disengaging quads during long sit activity    Pain Level (0-10 scale) post treatment: 0    ASSESSMENT/Changes in Function:     Patient will continue to benefit from skilled PT services to modify and progress therapeutic interventions, address functional mobility deficits, address ROM deficits, address strength deficits, analyze and address soft tissue restrictions, analyze and cue movement patterns, analyze and modify body mechanics/ergonomics, assess and modify postural abnormalities, address imbalance/dizziness and instruct in home and community integration to attain remaining goals.      []  See Plan of Care  []  See progress note/recertification  []  See Discharge Summary         Progress towards goals / Updated goals: Discussed need to follow up with MD regarding low blood pressure. Discussed seeing an ENT.       PLAN   [x]  Upgrade activities as tolerated     [x]  Continue plan of care  [x]  Update interventions per flow sheet       []  Discharge due to:_  []  Other:_      Amada Gaxiola, PT 8/10/2021

## 2021-08-17 ENCOUNTER — HOSPITAL ENCOUNTER (OUTPATIENT)
Dept: PHYSICAL THERAPY | Age: 31
Discharge: HOME | End: 2021-08-17
Payer: COMMERCIAL

## 2021-08-17 PROCEDURE — 97112 NEUROMUSCULAR REEDUCATION: CPT | Performed by: PHYSICAL THERAPIST

## 2021-08-17 PROCEDURE — 97016 VASOPNEUMATIC DEVICE THERAPY: CPT | Performed by: PHYSICAL THERAPIST

## 2021-08-17 PROCEDURE — 97140 MANUAL THERAPY 1/> REGIONS: CPT | Performed by: PHYSICAL THERAPIST

## 2021-08-17 NOTE — PROGRESS NOTES
PT DAILY TREATMENT NOTE - [facility] 2-15    Patient Name: Go Sanford  Date:2021  : 1990  [x]  Patient  Verified  Payor: BLUE CROSS / Plan: [address] [place] [address] / Product Type: PPO /    In time: 145 PM  Out time: 245 PM  Total Treatment Time (min): 60  Total Timed Codes (min): 45  1:1 Treatment Time ( only): 45  Visit #:  14    Treatment Area: Headache with orthostatic component, not elsewhere classified [R51.0]    SUBJECTIVE  Pain Level (0-10 scale): 5/10  Any medication changes, allergies to medications, adverse drug reactions, diagnosis change, or new procedure performed?: [x] No    [] Yes (see summary sheet for update)  Subjective functional status/changes:   [] No changes reported  Pt reports she has been very busy   Pt will not be seeing chiropractor again as he was billing PT  Pt was supposed to have an abdominal release session but it was cancelled because the raulito was sick  Pt using a theragun as needed    OBJECTIVE    Modality rationale: decrease pain and increase tissue extensibility to improve the patients ability to sit, stand, transfer, ambulate, lift, carry, reach, complete ADLs   Min Type Additional Details       [] Estim: []Att   []Unatt    []TENS instruct                  []IFC  []Premod   []NMES                     []Other:  []w/US   []w/ice   []w/heat  Position:  Location:       []  Traction: [] Cervical       []Lumbar                       [] Prone          []Supine                       []Intermittent   []Continuous Lbs:  [] before manual  [] after manual  []w/heat    []  Ultrasound: []Continuous   [] Pulsed                       at: []1MHz   []3MHz Location:  W/cm2:    [] Paraffin         Location:   []w/heat      []  Ice     [x]  Heat  []  Ice massage Position: supine  Location: neck, back and chest    []  Laser  []  Other: Position:  Location:   15   [x]  Vasopneumatic Device Pressure:       [] lo [x] med [] hi   Temperature: 34     [x] Skin assessment post-treatment:  [x]intact []redness- no adverse reaction    []redness  adverse reaction:     15 min Neuromuscular Re-education:  [x]  See flow sheet :   Rationale: improve coordination, improve balance and increase proprioception  to improve the patients ability to sit, stand, transfer, ambulate, lift, carry, reach, complete ADLs    30 min Manual Therapy:  rib pumping, infraclavicular pumping, L AIC technique, 2 person central diaphragm release, MFR to diaphragm and rectus abs (L), MFR to R QL and QL stretching. Rationale: decrease pain, increase ROM, increase tissue extensibility and decrease trigger points to improve the patients ability to sit, stand, transfer, ambulate, lift, carry, reach, complete ADLs      With   [] TE   [] TA   [x] Neuro   [] SC   [] other: Patient Education: [x] Review HEP    [] Progressed/Changed HEP based on:   [x] positioning   [x] body mechanics   [] transfers   [x] heat/ice application    [] other:      Other Objective/Functional Measures:   Pain with B overhead reaching, significant lumbar lordosis with hands in prayer position   Unable to turn off rectus abs during wall press, able in seated bar reach      Pain Level (0-10 scale) post treatment: 0    ASSESSMENT/Changes in Function:     Patient will continue to benefit from skilled PT services to modify and progress therapeutic interventions, address functional mobility deficits, address ROM deficits, address strength deficits, analyze and address soft tissue restrictions, analyze and cue movement patterns, analyze and modify body mechanics/ergonomics, assess and modify postural abnormalities, address imbalance/dizziness and instruct in home and community integration to attain remaining goals.      []  See Plan of Care  []  See progress note/recertification  []  See Discharge Summary         Progress towards goals / Updated goals:   Pt with good ability to disengage rectus abs in sitting this visit, will continue to work on for HW.  HA relief with vasopneumatic compression this visit.     PLAN   [x]  Upgrade activities as tolerated     [x]  Continue plan of care  [x]  Update interventions per flow sheet       []  Discharge due to:_  []  Other:_      Catracho Martinez, PT 8/17/2021

## 2021-08-24 ENCOUNTER — APPOINTMENT (OUTPATIENT)
Dept: PHYSICAL THERAPY | Age: 31
End: 2021-08-24
Payer: COMMERCIAL

## 2021-08-25 ENCOUNTER — HOSPITAL ENCOUNTER (OUTPATIENT)
Dept: PHYSICAL THERAPY | Age: 31
Discharge: HOME | End: 2021-08-25
Payer: COMMERCIAL

## 2021-08-25 PROCEDURE — 97016 VASOPNEUMATIC DEVICE THERAPY: CPT | Performed by: PHYSICAL THERAPIST

## 2021-08-25 PROCEDURE — 97112 NEUROMUSCULAR REEDUCATION: CPT | Performed by: PHYSICAL THERAPIST

## 2021-08-25 PROCEDURE — 97140 MANUAL THERAPY 1/> REGIONS: CPT | Performed by: PHYSICAL THERAPIST

## 2021-08-25 NOTE — PROGRESS NOTES
PT DAILY TREATMENT NOTE - [facility] 2-15    Patient Name: Eduardo Weber  Date:2021  : 1990  [x]  Patient  Verified  Payor: BLUE CROSS / Plan: [address] [place] [address] / Product Type: PPO /    In time: 105 PM  Out time: 215 PM   Total Treatment Time (min): 70  Total Timed Codes (min): 55  1:1 Treatment Time ( only): 55  Visit #:  15    Treatment Area: Headache with orthostatic component, not elsewhere classified [R51.0]    SUBJECTIVE  Pain Level (0-10 scale): 5/10  Any medication changes, allergies to medications, adverse drug reactions, diagnosis change, or new procedure performed?: [x] No    [] Yes (see summary sheet for update)  Subjective functional status/changes:   [] No changes reported  Pt reports an increase in HA this week and upper back tightness \"I had a bad visit with my doctor where he disregarded my symptoms so now I am going to see a new NP next week\" \"My dog is having surgery right now\" \"I got new shoes\"    OBJECTIVE    Modality rationale: decrease pain and increase tissue extensibility to improve the patients ability to sit, stand, transfer, ambulate, lift, carry, reach, complete ADLs   Min Type Additional Details       [] Estim: []Att   []Unatt    []TENS instruct                  []IFC  []Premod   []NMES                     []Other:  []w/US   []w/ice   []w/heat  Position:  Location:       []  Traction: [] Cervical       []Lumbar                       [] Prone          []Supine                       []Intermittent   []Continuous Lbs:  [] before manual  [] after manual  []w/heat    []  Ultrasound: []Continuous   [] Pulsed                       at: []1MHz   []3MHz Location:  W/cm2:    [] Paraffin         Location:   []w/heat      []  Ice     [x]  Heat  []  Ice massage Position: supine  Location: neck, back and chest    []  Laser  []  Other: Position:  Location:   15   [x]  Vasopneumatic Device Pressure:       [] lo [x] med [] hi   Temperature: 34     [x] Skin assessment post-treatment:  [x]intact []redness- no adverse reaction    []redness  adverse reaction:     30 min Neuromuscular Re-education:  [x]  See flow sheet :   Rationale: improve coordination, improve balance and increase proprioception  to improve the patients ability to sit, stand, transfer, ambulate, lift, carry, reach, complete ADLs    30 min Manual Therapy:  rib pumping, infraclavicular pumping, L AIC technique, 2 person central diaphragm release, MFR to diaphragm and rectus abs (L), MFR to R QL and QL stretching. Rationale: decrease pain, increase ROM, increase tissue extensibility and decrease trigger points to improve the patients ability to sit, stand, transfer, ambulate, lift, carry, reach, complete ADLs      With   [] TE   [] TA   [x] Neuro   [] SC   [] other: Patient Education: [x] Review HEP    [] Progressed/Changed HEP based on:   [x] positioning   [x] body mechanics   [] transfers   [x] heat/ice application    [] other:      Other Objective/Functional Measures:   Horizontal abd L 20 deg , improved to 30 following L sidelying activity     Pt with good sensory awareness in chicos    Pain Level (0-10 scale) post treatment: 0    ASSESSMENT/Changes in Function:     Patient will continue to benefit from skilled PT services to modify and progress therapeutic interventions, address functional mobility deficits, address ROM deficits, address strength deficits, analyze and address soft tissue restrictions, analyze and cue movement patterns, analyze and modify body mechanics/ergonomics, assess and modify postural abnormalities, address imbalance/dizziness and instruct in home and community integration to attain remaining goals. []  See Plan of Care  []  See progress note/recertification  []  See Discharge Summary         Progress towards goals / Updated goals:   Improved horizontal abduction with today's interventions.     PLAN   [x]  Upgrade activities as tolerated     [x]  Continue plan of care  [x]  Update interventions per flow sheet       []  Discharge due to:_  []  Other:_      Tata Morris, PT 8/25/2021

## 2021-09-09 ENCOUNTER — APPOINTMENT (OUTPATIENT)
Dept: PHYSICAL THERAPY | Age: 31
End: 2021-09-09
Payer: COMMERCIAL

## 2021-09-16 ENCOUNTER — HOSPITAL ENCOUNTER (OUTPATIENT)
Dept: PHYSICAL THERAPY | Age: 31
Discharge: HOME | End: 2021-09-16
Payer: COMMERCIAL

## 2021-09-16 PROCEDURE — 97112 NEUROMUSCULAR REEDUCATION: CPT | Performed by: PHYSICAL THERAPIST

## 2021-09-16 PROCEDURE — 97140 MANUAL THERAPY 1/> REGIONS: CPT | Performed by: PHYSICAL THERAPIST

## 2021-09-16 PROCEDURE — 97016 VASOPNEUMATIC DEVICE THERAPY: CPT | Performed by: PHYSICAL THERAPIST
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
## 2021-09-23 ENCOUNTER — HOSPITAL ENCOUNTER (OUTPATIENT)
Dept: PHYSICAL THERAPY | Age: 31
Discharge: HOME | End: 2021-09-23
Payer: COMMERCIAL

## 2021-09-23 PROCEDURE — 97112 NEUROMUSCULAR REEDUCATION: CPT | Performed by: PHYSICAL THERAPIST

## 2021-09-23 PROCEDURE — 97140 MANUAL THERAPY 1/> REGIONS: CPT | Performed by: PHYSICAL THERAPIST

## 2021-09-23 PROCEDURE — 97016 VASOPNEUMATIC DEVICE THERAPY: CPT | Performed by: PHYSICAL THERAPIST

## 2021-09-23 NOTE — PROGRESS NOTES
PT DAILY TREATMENT NOTE - [facility] 2-15    Patient Name: Go Sanford  Date:2021  : 1990  [x]  Patient  Verified  Payor: [employer] / Plan: [address] [place] [address] / Product Type: PPO /    In time: 1050 AM Treatment Time (min): 1210 PM  Total Timed Codes (min): 80  1:1 Treatment Time (MC only): 60  Visit #:  17    Treatment Area: Headache with orthostatic component, not elsewhere classified [R51.0]    SUBJECTIVE  Pain Level (0-10 scale): 5/10  Any medication changes, allergies to medications, adverse drug reactions, diagnosis change, or new procedure performed?: [x] No    [] Yes (see summary sheet for update)  Subjective functional status/changes:   [] No changes reported  Pt reports she sees GI next week and her PCP the following next week  Her abdominal unlocking has been rescheduled once again  Pt has noticed she isn't firing her RA in s/L as much     OBJECTIVE    Modality rationale: decrease pain and increase tissue extensibility to improve the patients ability to sit, stand, transfer, ambulate, lift, carry, reach, complete ADLs   Min Type Additional Details       [] Estim: []Att   []Unatt    []TENS instruct                  []IFC  []Premod   []NMES                     []Other:  []w/US   []w/ice   []w/heat  Position:  Location:       []  Traction: [] Cervical       []Lumbar                       [] Prone          []Supine                       []Intermittent   []Continuous Lbs:  [] before manual  [] after manual  []w/heat    []  Ultrasound: []Continuous   [] Pulsed                       at: []1MHz   []3MHz Location:  W/cm2:    [] Paraffin         Location:   []w/heat      []  Ice     [x]  Heat  []  Ice massage Position: supine  Location: neck, back and chest    []  Laser  []  Other: Position:  Location:   15   [x]  Vasopneumatic Device Pressure:       [] lo [x] med [] hi   Temperature: 34     [x] Skin assessment post-treatment:  [x]intact []redness- no adverse reaction    []redness  adverse reaction:     30 min Neuromuscular Re-education:  [x]  See flow sheet :   Rationale: improve coordination, improve balance and increase proprioception  to improve the patients ability to sit, stand, transfer, ambulate, lift, carry, reach, complete ADLs    30 min Manual Therapy:  rib pumping, infraclavicular pumping, L AIC technique, 2 person central diaphragm release, MFR to diaphragm and rectus abs (L), MFR to R QL and QL stretching. Rationale: decrease pain, increase ROM, increase tissue extensibility and decrease trigger points to improve the patients ability to sit, stand, transfer, ambulate, lift, carry, reach, complete ADLs      With   [] TE   [] TA   [x] Neuro   [] SC   [] other: Patient Education: [x] Review HEP    [] Progressed/Changed HEP based on:   [x] positioning   [x] body mechanics   [] transfers   [x] heat/ice application    [] other:      Other Objective/Functional Measures:   Increase in tension with R gaze and LTR activities  HA tension relieved with R gaze activities with RTR activities    Pain Level (0-10 scale) post treatment: 0    ASSESSMENT/Changes in Function:     Patient will continue to benefit from skilled PT services to modify and progress therapeutic interventions, address functional mobility deficits, address ROM deficits, address strength deficits, analyze and address soft tissue restrictions, analyze and cue movement patterns, analyze and modify body mechanics/ergonomics, assess and modify postural abnormalities, address imbalance/dizziness and instruct in home and community integration to attain remaining goals. []  See Plan of Care  []  See progress note/recertification  []  See Discharge Summary         Progress towards goals / Updated goals:   Continued work to find pain relieving techniques. Improved decactiviation of Rectus Abs and activation of IO/TAs.     PLAN   [x]  Upgrade activities as tolerated     [x]  Continue plan of care  [x]  Update interventions per flow sheet       []  Discharge due to:_  []  Other:_      Jose Alfredo Walker, PT 9/23/2021

## 2021-09-30 ENCOUNTER — HOSPITAL ENCOUNTER (OUTPATIENT)
Dept: PHYSICAL THERAPY | Age: 31
Discharge: HOME | End: 2021-09-30
Payer: COMMERCIAL

## 2021-09-30 PROCEDURE — 97112 NEUROMUSCULAR REEDUCATION: CPT | Performed by: PHYSICAL THERAPIST

## 2021-09-30 PROCEDURE — 97016 VASOPNEUMATIC DEVICE THERAPY: CPT | Performed by: PHYSICAL THERAPIST

## 2021-09-30 PROCEDURE — 97140 MANUAL THERAPY 1/> REGIONS: CPT | Performed by: PHYSICAL THERAPIST

## 2021-09-30 NOTE — PROGRESS NOTES
PT DAILY TREATMENT NOTE - [facility] 2-15    Patient Name: Dawna Torres  Date:2021  : 1990  [x]  Patient  Verified  Payor: Félix [employer] / Plan: [address] [place] [address] / Product Type: PPO /    In time:5:00 PM Treatment Time (min): 620 PM  Total Timed Codes (min): 80  1:1 Treatment Time ( only): 60  Visit #:  18    Treatment Area: Headache with orthostatic component, not elsewhere classified [R51.0]    SUBJECTIVE  Pain Level (0-10 scale): 5/10  Any medication changes, allergies to medications, adverse drug reactions, diagnosis change, or new procedure performed?: [x] No    [] Yes (see summary sheet for update)  Subjective functional status/changes:   [] No changes reported  Pt reports she saw GI who scheduled an endoscopic examination  Pt has been having more pain at the top of her head  She had a massage which felt good but it was short lived    OBJECTIVE    Modality rationale: decrease pain and increase tissue extensibility to improve the patients ability to sit, stand, transfer, ambulate, lift, carry, reach, complete ADLs   Min Type Additional Details       [] Estim: []Att   []Unatt    []TENS instruct                  []IFC  []Premod   []NMES                     []Other:  []w/US   []w/ice   []w/heat  Position:  Location:       []  Traction: [] Cervical       []Lumbar                       [] Prone          []Supine                       []Intermittent   []Continuous Lbs:  [] before manual  [] after manual  []w/heat    []  Ultrasound: []Continuous   [] Pulsed                       at: []1MHz   []3MHz Location:  W/cm2:    [] Paraffin         Location:   []w/heat      []  Ice     [x]  Heat  []  Ice massage Position: supine  Location: neck, back and chest    []  Laser  []  Other: Position:  Location:   15   [x]  Vasopneumatic Device Pressure:       [] lo [x] med [] hi   Temperature: 34     [x] Skin assessment post-treatment:  [x]intact []redness- no adverse reaction    []redness  adverse reaction: 30 min Neuromuscular Re-education:  [x]  See flow sheet :   Rationale: improve coordination, improve balance and increase proprioception  to improve the patients ability to sit, stand, transfer, ambulate, lift, carry, reach, complete ADLs    30 min Manual Therapy:  rib pumping, infraclavicular pumping, L AIC technique, 2 person central diaphragm release, MFR to diaphragm and rectus abs (L), MFR to R QL and QL stretching. Rationale: decrease pain, increase ROM, increase tissue extensibility and decrease trigger points to improve the patients ability to sit, stand, transfer, ambulate, lift, carry, reach, complete ADLs      With   [] TE   [] TA   [x] Neuro   [] SC   [] other: Patient Education: [x] Review HEP    [] Progressed/Changed HEP based on:   [x] positioning   [x] body mechanics   [] transfers   [x] heat/ice application    [] other:      Other Objective/Functional Measures:   Difficulty achieving L AFIR during stance activity     Pain Level (0-10 scale) post treatment: 0    ASSESSMENT/Changes in Function:     Patient will continue to benefit from skilled PT services to modify and progress therapeutic interventions, address functional mobility deficits, address ROM deficits, address strength deficits, analyze and address soft tissue restrictions, analyze and cue movement patterns, analyze and modify body mechanics/ergonomics, assess and modify postural abnormalities, address imbalance/dizziness and instruct in home and community integration to attain remaining goals. []  See Plan of Care  []  See progress note/recertification  []  See Discharge Summary         Progress towards goals / Updated goals:   Reviewed form with HEP, pt required verbal and tactile cues.     PLAN   [x]  Upgrade activities as tolerated     [x]  Continue plan of care  [x]  Update interventions per flow sheet       []  Discharge due to:_  []  Other:_      Doc Leiva, PT 9/30/2021

## 2021-10-07 ENCOUNTER — HOSPITAL ENCOUNTER (OUTPATIENT)
Dept: PHYSICAL THERAPY | Age: 31
Discharge: HOME | End: 2021-10-07
Payer: COMMERCIAL

## 2021-10-07 PROCEDURE — 97140 MANUAL THERAPY 1/> REGIONS: CPT | Performed by: PHYSICAL THERAPIST

## 2021-10-07 PROCEDURE — 97112 NEUROMUSCULAR REEDUCATION: CPT | Performed by: PHYSICAL THERAPIST

## 2021-10-07 NOTE — PROGRESS NOTES
PT DAILY TREATMENT NOTE - [facility] 2-15    Patient Name: Braxton Cuba  Date:10/7/2021  : 1990  [x]  Patient  Verified  Payor: [employer] / Plan: [address] [place] [address] / Product Type: PPO /    In time: 1045 AM Treatment Time (min): 1205 PM  Total Time (min): 80  Total Timed Codes (min): 65  1:1 Treatment Time ( only): 55  Visit #:  19    Treatment Area: Headache with orthostatic component, not elsewhere classified [R51.0]    SUBJECTIVE  Pain Level (0-10 scale): 5/10  Any medication changes, allergies to medications, adverse drug reactions, diagnosis change, or new procedure performed?: [x] No    [] Yes (see summary sheet for update)  Subjective functional status/changes:   [] No changes reported  Endoscopic examination was negative  Pt has Botox injections tomorrow   Pt reports compliance with HEP   Next Saturday she has a 2 hour massage   Pt reports no HA with current HEP  \"I can't sit up > 15 min\"    OBJECTIVE    Modality rationale: decrease pain and increase tissue extensibility to improve the patients ability to sit, stand, transfer, ambulate, lift, carry, reach, complete ADLs   Min Type Additional Details       [] Estim: []Att   []Unatt    []TENS instruct                  []IFC  []Premod   []NMES                     []Other:  []w/US   []w/ice   []w/heat  Position:  Location:       []  Traction: [] Cervical       []Lumbar                       [] Prone          []Supine                       []Intermittent   []Continuous Lbs:  [] before manual  [] after manual  []w/heat    []  Ultrasound: []Continuous   [] Pulsed                       at: []1MHz   []3MHz Location:  W/cm2:    [] Paraffin         Location:   []w/heat     15 [x]  Ice     [x]  Heat  []  Ice massage Position: supine  Location: neck, back and chest    []  Laser  []  Other: Position:  Location:      [x]  Vasopneumatic Device Pressure:       [] lo [x] med [] hi   Temperature: 34     [x] Skin assessment post-treatment:  [x]intact []redness- no adverse reaction    []redness  adverse reaction:     35 min Neuromuscular Re-education:  [x]  See flow sheet :   Rationale: improve coordination, improve balance and increase proprioception  to improve the patients ability to sit, stand, transfer, ambulate, lift, carry, reach, complete ADLs    30 min Manual Therapy:  rib pumping, infraclavicular pumping, L AIC technique, 2 person central diaphragm release, MFR to diaphragm and rectus abs (L), MFR to R QL and QL stretching. Rationale: decrease pain, increase ROM, increase tissue extensibility and decrease trigger points to improve the patients ability to sit, stand, transfer, ambulate, lift, carry, reach, complete ADLs      With   [] TE   [] TA   [x] Neuro   [] SC   [] other: Patient Education: [x] Review HEP    [] Progressed/Changed HEP based on:   [x] positioning   [x] body mechanics   [] transfers   [x] heat/ice application    [] other:      Other Objective/Functional Measures:   Limited horizontal abd B R tighter than L  Improved horizontal abd PROM following manual intervention    Advanced standing HEP  Able to sense IO/TA during seated activity with unilateral arm lift    Pain Level (0-10 scale) post treatment: 0    ASSESSMENT/Changes in Function:     Patient will continue to benefit from skilled PT services to modify and progress therapeutic interventions, address functional mobility deficits, address ROM deficits, address strength deficits, analyze and address soft tissue restrictions, analyze and cue movement patterns, analyze and modify body mechanics/ergonomics, assess and modify postural abnormalities, address imbalance/dizziness and instruct in home and community integration to attain remaining goals. []  See Plan of Care  []  See progress note/recertification  []  See Discharge Summary         Progress towards goals / Updated goals:   Advanced HEP. Pt given verbal and tactile cues.     PLAN   [x]  Upgrade activities as tolerated [x]  Continue plan of care  [x]  Update interventions per flow sheet       []  Discharge due to:_  []  Other:_      Silvia Ambrosio, PT 10/7/2021

## 2021-10-15 ENCOUNTER — HOSPITAL ENCOUNTER (OUTPATIENT)
Dept: PHYSICAL THERAPY | Age: 31
Discharge: HOME | End: 2021-10-15
Payer: COMMERCIAL

## 2021-10-15 PROCEDURE — 97016 VASOPNEUMATIC DEVICE THERAPY: CPT | Performed by: PHYSICAL THERAPIST

## 2021-10-15 PROCEDURE — 97140 MANUAL THERAPY 1/> REGIONS: CPT | Performed by: PHYSICAL THERAPIST

## 2021-10-15 PROCEDURE — 97112 NEUROMUSCULAR REEDUCATION: CPT | Performed by: PHYSICAL THERAPIST

## 2021-10-15 NOTE — PROGRESS NOTES
PT DAILY TREATMENT NOTE - [facility] 2-15    Patient Name: Musa Post  Date:10/15/2021  : 1990  [x]  Patient  Verified  Payor: [employer] / Plan: [address] [place] [address] / Product Type: PPO /    In time: 845 AM Out Time (min): 1005 AM  Total Time (min): 80  Total Timed Codes (min): 65  1:1 Treatment Time ( only): 65  Visit #:  20    Treatment Area: Headache with orthostatic component, not elsewhere classified [R51.0]    SUBJECTIVE  Pain Level (0-10 scale): 5/10  Any medication changes, allergies to medications, adverse drug reactions, diagnosis change, or new procedure performed?: [x] No    [] Yes (see summary sheet for update)  Subjective functional status/changes:   [] No changes reported   Botox injection resulted in some ringing in the ear and dizziness and nausea, she has not had any relief and she will get the next one in 12 weeks   Pt reports she has an abdominal unlocking session Saturday   Pt reports she is using her middle abdominal muscle during humming   \"I went to bed late and have a paper due tonight\"    OBJECTIVE    Modality rationale: decrease pain and increase tissue extensibility to improve the patients ability to sit, stand, transfer, ambulate, lift, carry, reach, complete ADLs   Min Type Additional Details       [] Estim: []Att   []Unatt    []TENS instruct                  []IFC  []Premod   []NMES                     []Other:  []w/US   []w/ice   []w/heat  Position:  Location:       []  Traction: [] Cervical       []Lumbar                       [] Prone          []Supine                       []Intermittent   []Continuous Lbs:  [] before manual  [] after manual  []w/heat    []  Ultrasound: []Continuous   [] Pulsed                       at: []1MHz   []3MHz Location:  W/cm2:    [] Paraffin         Location:   []w/heat     15 [x]  Ice     [x]  Heat  []  Ice massage Position: supine 90/90  Location: back and stomach    []  Laser  []  Other: Position:  Location:   15   [x]  Vasopneumatic Device Pressure:       [] lo [x] med [] hi   Temperature: 34     [x] Skin assessment post-treatment:  [x]intact []redness- no adverse reaction    []redness  adverse reaction:     35 min Neuromuscular Re-education:  [x]  See flow sheet :   Rationale: improve coordination, improve balance and increase proprioception  to improve the patients ability to sit, stand, transfer, ambulate, lift, carry, reach, complete ADLs    30 min Manual Therapy:  rib pumping, infraclavicular pumping, L AIC technique, 2 person central diaphragm release, MFR to diaphragm and rectus abs (L), MFR to R QL and QL stretching. Rationale: decrease pain, increase ROM, increase tissue extensibility and decrease trigger points to improve the patients ability to sit, stand, transfer, ambulate, lift, carry, reach, complete ADLs      With   [] TE   [] TA   [x] Neuro   [] SC   [] other: Patient Education: [x] Review HEP    [] Progressed/Changed HEP based on:   [x] positioning   [x] body mechanics   [] transfers   [x] heat/ice application    [] other:      Other Objective/Functional Measures:   Reduced headache pain with glance R during longsit activity  Added vision #1 activity    Pain Level (0-10 scale) post treatment: less pain    ASSESSMENT/Changes in Function:     Patient will continue to benefit from skilled PT services to modify and progress therapeutic interventions, address functional mobility deficits, address ROM deficits, address strength deficits, analyze and address soft tissue restrictions, analyze and cue movement patterns, analyze and modify body mechanics/ergonomics, assess and modify postural abnormalities, address imbalance/dizziness and instruct in home and community integration to attain remaining goals. []  See Plan of Care  []  See progress note/recertification  []  See Discharge Summary         Progress towards goals / Updated goals:   Pt encouraged to follow up with Dr. Trini Gibbs.     PLAN   [x]  Upgrade activities as tolerated     [x]  Continue plan of care  [x]  Update interventions per flow sheet       []  Discharge due to:_  []  Other:_      Yoel Jackson, PT 10/15/2021

## 2021-10-22 ENCOUNTER — HOSPITAL ENCOUNTER (OUTPATIENT)
Dept: PHYSICAL THERAPY | Age: 31
Discharge: HOME | End: 2021-10-22
Payer: COMMERCIAL

## 2021-10-22 PROCEDURE — 97112 NEUROMUSCULAR REEDUCATION: CPT | Performed by: PHYSICAL THERAPIST

## 2021-10-22 PROCEDURE — 97140 MANUAL THERAPY 1/> REGIONS: CPT | Performed by: PHYSICAL THERAPIST

## 2021-10-22 PROCEDURE — 97016 VASOPNEUMATIC DEVICE THERAPY: CPT | Performed by: PHYSICAL THERAPIST

## 2021-10-22 NOTE — PROGRESS NOTES
PT DAILY TREATMENT NOTE - [facility] 2-15    Patient Name: Musa Post  Date:10/22/2021  : 1990  [x]  Patient  Verified  Payor: [employer] / Plan: [address] [place] [address] / Product Type: PPO /    In time: 1000 AM Out Time (min): 1100 AM  Total Time (min):60   Total Timed Codes (min): 45  1:1 Treatment Time ( only): 39  Visit #:  21    Treatment Area: Headache with orthostatic component, not elsewhere classified [R51.0]    SUBJECTIVE  Pain Level (0-10 scale): 5/10  Any medication changes, allergies to medications, adverse drug reactions, diagnosis change, or new procedure performed?: [x] No    [] Yes (see summary sheet for update)  Subjective functional status/changes:   [] No changes reported  Pt reports she had abdominal unlocking but relief lasted <24 hours  Pt reports she has been under a lot of stress caring for her dogs with her boyfriend out of town this past week    OBJECTIVE    Modality rationale: decrease pain and increase tissue extensibility to improve the patients ability to sit, stand, transfer, ambulate, lift, carry, reach, complete ADLs   Min Type Additional Details       [] Estim: []Att   []Unatt    []TENS instruct                  []IFC  []Premod   []NMES                     []Other:  []w/US   []w/ice   []w/heat  Position:  Location:       []  Traction: [] Cervical       []Lumbar                       [] Prone          []Supine                       []Intermittent   []Continuous Lbs:  [] before manual  [] after manual  []w/heat    []  Ultrasound: []Continuous   [] Pulsed                       at: []1MHz   []3MHz Location:  W/cm2:    [] Paraffin         Location:   []w/heat     15 [x]  Ice     [x]  Heat  []  Ice massage Position: supine 90/90  Location: back and stomach    []  Laser  []  Other: Position:  Location:   15   [x]  Vasopneumatic Device Pressure:       [] lo [x] med [] hi   Temperature: 34     [x] Skin assessment post-treatment:  [x]intact []redness- no adverse reaction []redness  adverse reaction:     20 min Neuromuscular Re-education:  [x]  See flow sheet :   Rationale: improve coordination, improve balance and increase proprioception  to improve the patients ability to sit, stand, transfer, ambulate, lift, carry, reach, complete ADLs    25 min Manual Therapy:  rib pumping, infraclavicular pumping, L AIC technique, 2 person central diaphragm release, MFR to diaphragm and rectus abs (L), MFR to R QL and QL stretching. Rationale: decrease pain, increase ROM, increase tissue extensibility and decrease trigger points to improve the patients ability to sit, stand, transfer, ambulate, lift, carry, reach, complete ADLs      With   [] TE   [] TA   [x] Neuro   [] SC   [] other: Patient Education: [x] Review HEP    [] Progressed/Changed HEP based on:   [x] positioning   [x] body mechanics   [] transfers   [x] heat/ice application    [] other:      Other Objective/Functional Measures:   Reviewed vision #1 activity, UE support with stick added    Pain Level (0-10 scale) post treatment: less pain    ASSESSMENT/Changes in Function:     Patient will continue to benefit from skilled PT services to modify and progress therapeutic interventions, address functional mobility deficits, address ROM deficits, address strength deficits, analyze and address soft tissue restrictions, analyze and cue movement patterns, analyze and modify body mechanics/ergonomics, assess and modify postural abnormalities, address imbalance/dizziness and instruct in home and community integration to attain remaining goals. []  See Plan of Care  []  See progress note/recertification  []  See Discharge Summary         Progress towards goals / Updated goals:   Pt has vision appointment next month. Good breathing tolerance without RA recruitment.     PLAN   [x]  Upgrade activities as tolerated     [x]  Continue plan of care  [x]  Update interventions per flow sheet       []  Discharge due to:_  []  Other:_      Calixto Confer Nayan Potts, PT 10/22/2021

## 2021-10-29 ENCOUNTER — HOSPITAL ENCOUNTER (OUTPATIENT)
Dept: PHYSICAL THERAPY | Age: 31
Discharge: HOME | End: 2021-10-29
Payer: COMMERCIAL

## 2021-10-29 PROCEDURE — 97112 NEUROMUSCULAR REEDUCATION: CPT | Performed by: PHYSICAL THERAPIST

## 2021-10-29 PROCEDURE — 97140 MANUAL THERAPY 1/> REGIONS: CPT | Performed by: PHYSICAL THERAPIST

## 2021-10-29 NOTE — PROGRESS NOTES
PT DAILY TREATMENT NOTE - [facility] 2-15    Patient Name: Federico Lal  Date:10/29/2021  : 1990  [x]  Patient  Verified  Payor: BLUE CROSS / Plan: [address] [place] [address] / Product Type: PPO /    In time: 1045 AM Out Time (min): 1155 AM  Total Time (min): 70   Total Timed Codes (min): 55  1:1 Treatment Time ( only): 40  Visit #:  22    Treatment Area: Headache with orthostatic component, not elsewhere classified [R51.0]    SUBJECTIVE  Pain Level (0-10 scale): 5/10  Any medication changes, allergies to medications, adverse drug reactions, diagnosis change, or new procedure performed?: [x] No    [] Yes (see summary sheet for update)  Subjective functional status/changes:   [] No changes reported  Pt reports she is stressed about moving in December.   \"I sat around a campfire and made smores and my pain was worse for the next 2 days\"      OBJECTIVE    Modality rationale: decrease pain and increase tissue extensibility to improve the patients ability to sit, stand, transfer, ambulate, lift, carry, reach, complete ADLs   Min Type Additional Details       [] Estim: []Att   []Unatt    []TENS instruct                  []IFC  []Premod   []NMES                     []Other:  []w/US   []w/ice   []w/heat  Position:  Location:       []  Traction: [] Cervical       []Lumbar                       [] Prone          []Supine                       []Intermittent   []Continuous Lbs:  [] before manual  [] after manual  []w/heat    []  Ultrasound: []Continuous   [] Pulsed                       at: []1MHz   []3MHz Location:  W/cm2:    [] Paraffin         Location:   []w/heat     15 [x]  Ice     [x]  Heat  []  Ice massage Position: supine 90/90  Location: back and stomach    []  Laser  []  Other: Position:  Location:   15   [x]  Vasopneumatic Device Pressure:       [] lo [x] med [] hi   Temperature: 34     [x] Skin assessment post-treatment:  [x]intact []redness- no adverse reaction    []redness  adverse reaction:     30 min Neuromuscular Re-education:  [x]  See flow sheet :   Rationale: improve coordination, improve balance and increase proprioception  to improve the patients ability to sit, stand, transfer, ambulate, lift, carry, reach, complete ADLs    25 min Manual Therapy:  rib pumping, infraclavicular pumping, L AIC technique, 2 person central diaphragm release, MFR to diaphragm and rectus abs (L), MFR to R QL and QL stretching. Rationale: decrease pain, increase ROM, increase tissue extensibility and decrease trigger points to improve the patients ability to sit, stand, transfer, ambulate, lift, carry, reach, complete ADLs      With   [] TE   [] TA   [x] Neuro   [] SC   [] other: Patient Education: [x] Review HEP    [] Progressed/Changed HEP based on:   [x] positioning   [x] body mechanics   [] transfers   [x] heat/ice application    [] other:      Other Objective/Functional Measures:   Mid back grinding with supine bar reach  T 8 ext reproduced sternal pressure which reduced with repitition    Pain Level (0-10 scale) post treatment: less pain    ASSESSMENT/Changes in Function:     Patient will continue to benefit from skilled PT services to modify and progress therapeutic interventions, address functional mobility deficits, address ROM deficits, address strength deficits, analyze and address soft tissue restrictions, analyze and cue movement patterns, analyze and modify body mechanics/ergonomics, assess and modify postural abnormalities, address imbalance/dizziness and instruct in home and community integration to attain remaining goals. []  See Plan of Care  []  See progress note/recertification  []  See Discharge Summary         Progress towards goals / Updated goals:   Pt assigned kazoo breathing.     PLAN   [x]  Upgrade activities as tolerated     [x]  Continue plan of care  [x]  Update interventions per flow sheet       []  Discharge due to:_  []  Other:_      Roxanne Morocho, PT 10/29/2021

## 2021-11-05 ENCOUNTER — APPOINTMENT (OUTPATIENT)
Dept: PHYSICAL THERAPY | Age: 31
End: 2021-11-05

## 2021-11-10 NOTE — PROGRESS NOTES
Physical Therapy at [facility],   a part of  [facility]  [place]  [facility] Nahed Magana  Phone: [phone]      Fax:  [phone]    Progress Note and Updated POC    Name: Ashly Willis   : 1990   MD: Severo Ar, MD       Treatment Diagnosis: Headache with orthostatic component, not elsewhere classified [R51.0]  Start of Care: 21    Visits from Start of Care: 22  Missed Visits: 0    Summary of Care: Therapeutic Exercise,  Manual Therapy, Neuromuscular Re-education, Vasopneumatic Compression, Patient Education, Home Exercise Program         Assessment / Recommendations: The patient has completed 22 PT visits and has met all short term goals and 1/4 long term goals. The patient continues to report 5/10 levels of pain in her neck and abdomen and continues to be limited by headaches. Pain fluctuates and is worsened with stress and increased physical exertion, including prolonged upright sitting. The patient continues to overuse rectus abdominus muscles and has difficulty engaging internal obliques and transverse abdominous. She has improved in this area over the course of PT with kazoo training, pursed lip breathing and humming. HA pain fluctuates and reduces slightly with vaso pneumatic compression to her head and focus on R lateral gaze with her visual system. She has recently had an optometrist visit who changed her prescription and recommended vision therapy which will hopefully help to speed up progression towards goals. The patient would benefit from continued PT 1x/week for up to 12 additional weeks to improve breathing mechanics, restore strength without overstressing rectus abdominus, diaphragm or intercostals, and improve sensory awareness of position to reduce joint stress and pain and improve tolerance of upright activity. Short Term Goals:  To be accomplished in 4 weeks:  1) The patient will be independent with introductory HEP - MET  2) The patient will demonstrate negative PADT to indicate neutral pelvic position - MET  3) The patient will report ability to sit at her desk for >1 hour without a significant increase in symptoms - MET  Long Term Goals:  To be accomplished in 12 weeks: Update to 34 visits:  1) The patient will demonstrate L3 squat or greater to improve ease with household chores - NOT MET  2) The patient will demonstrate 90 deg shoulder IR PROM B to indicate improved scapular-thoracic position- MET  3) The patient will report ability to walk up and down inclines without an increase in pain for 30 minutes or greater - NOT MET  New Goal:  1) The patient will report ability to sit for 4 hours to improve ability to complete a 1/2 day of work NOT MET    Den Starks, PT 11/10/2021

## 2021-11-12 ENCOUNTER — APPOINTMENT (OUTPATIENT)
Dept: PHYSICAL THERAPY | Age: 31
End: 2021-11-12

## 2021-11-19 ENCOUNTER — APPOINTMENT (OUTPATIENT)
Dept: PHYSICAL THERAPY | Age: 31
End: 2021-11-19

## 2021-11-29 ENCOUNTER — APPOINTMENT (OUTPATIENT)
Dept: PHYSICAL THERAPY | Age: 31
End: 2021-11-29

## 2021-12-06 ENCOUNTER — APPOINTMENT (OUTPATIENT)
Dept: PHYSICAL THERAPY | Age: 31
End: 2021-12-06
Payer: COMMERCIAL

## 2022-01-04 ENCOUNTER — HOSPITAL ENCOUNTER (OUTPATIENT)
Dept: PHYSICAL THERAPY | Age: 32
Discharge: HOME | End: 2022-01-04
Payer: COMMERCIAL

## 2022-01-04 PROCEDURE — 97140 MANUAL THERAPY 1/> REGIONS: CPT | Performed by: PHYSICAL THERAPIST

## 2022-01-04 PROCEDURE — 97112 NEUROMUSCULAR REEDUCATION: CPT | Performed by: PHYSICAL THERAPIST

## 2022-01-04 PROCEDURE — 97016 VASOPNEUMATIC DEVICE THERAPY: CPT | Performed by: PHYSICAL THERAPIST

## 2022-01-04 NOTE — PROGRESS NOTES
Physical Therapy at [facility],   a part of  [facility]  [place]  [facility] Nga Magana  Phone: [phone]      Fax:  [phone]    Progress Note and Updated POC    Name: James Claire   : 1990   MD: Raysa Patrick MD       Treatment Diagnosis: Headache with orthostatic component, not elsewhere classified [R51.0]  Start of Care: 21    Visits from Start of Care: 23  Missed Visits: 0    Summary of Care: Therapeutic Exercise,  Manual Therapy, Neuromuscular Re-education, Vasopneumatic Compression, Patient Education, Home Exercise Program         Assessment / Recommendations: The patient has completed 23 PT visits and has met all short term goals and 1/4 long term goals. The patient continues to report 5/10 levels of pain in her neck and abdomen and continues to be limited by headaches. Pain fluctuates and is worsened with stress and increased physical exertion, including prolonged upright sitting and bending forward. The patient continues to overuse rectus abdominus muscles and has difficulty engaging internal obliques and transverse abdominous. She has improved in this area over the course of PT with kazoo training, pursed lip breathing and humming. HA pain fluctuates and reduces slightly with vaso pneumatic compression to her head and focus on R lateral gaze with her visual system. She has recently had an optometrist visit who changed her prescription and recommended vision therapy which will hopefully help to speed up progression towards goals.    The patient would benefit from continued PT 1x/week for up to 4 additional weeks to improve breathing mechanics, restore strength without overstressing rectus abdominus, diaphragm or intercostals, and improve sensory awareness of position to reduce joint stress and pain and improve tolerance of upright activity to prepare her to her move to [place] where she will initiate PT at another clinic. Short Term Goals: To be accomplished in 4 weeks:  1) The patient will be independent with introductory HEP - MET  2) The patient will demonstrate negative PADT to indicate neutral pelvic position - MET  3) The patient will report ability to sit at her desk for >1 hour without a significant increase in symptoms - MET  Long Term Goals:  To be accomplished in 12 weeks: Update to 34 visits:  1) The patient will demonstrate L3 squat or greater to improve ease with household chores - NOT MET  2) The patient will demonstrate 90 deg shoulder IR PROM B to indicate improved scapular-thoracic position- MET  3) The patient will report ability to walk up and down inclines without an increase in pain for 30 minutes or greater - NOT MET  New Goal:  1) The patient will report ability to sit for 4 hours to improve ability to complete a 1/2 day of work NOT MET    Cristal Ellsworth, PT 1/4/2022

## 2022-01-04 NOTE — PROGRESS NOTES
PT DAILY TREATMENT NOTE - [facility] 2-15    Patient Name: Richy Carlson  Date:2022  : 1990  [x]  Patient  Verified  Payor: Kamila Pinto / Plan: [address] [place] [address] / Product Type: PPO /    In time: 1:50 PM Out Time (min):3:05 PM  Total Time (min): 75  Total Timed Codes (min): 60  1:1 Treatment Time ( only): 60  Visit #:  23    Treatment Area: Headache with orthostatic component, not elsewhere classified [R51.0]    SUBJECTIVE  Pain Level (0-10 scale): 5/10  Any medication changes, allergies to medications, adverse drug reactions, diagnosis change, or new procedure performed?: [x] No    [] Yes (see summary sheet for update)  Subjective functional status/changes:   [] No changes reported  Pt reports she is moving to [place] at the end of the month   Pt reports she is feeling tight in her chest and migraines   Pt reports she traveled to see her family for [employer] and that was a lot of stress on her body   Pt reports she has been very stressed    Pt is going to try a vagus nerve massage \"I am going to try to schedule it next week\"   Pt is not getting much out of the visceral manipulation  Pt reports she stubbed her R toe when she was in [place].  \"It looks normal now\" \"Pain is getting better\"  Pt had a few sessions of vision therapy   Pt c/o continued pain in her diaphragm bending forward  OBJECTIVE    Modality rationale: decrease pain and increase tissue extensibility to improve the patients ability to sit, stand, transfer, ambulate, lift, carry, reach, complete ADLs   Min Type Additional Details       [] Estim: []Att   []Unatt    []TENS instruct                  []IFC  []Premod   []NMES                     []Other:  []w/US   []w/ice   []w/heat  Position:  Location:       []  Traction: [] Cervical       []Lumbar                       [] Prone          []Supine                       []Intermittent   []Continuous Lbs:  [] before manual  [] after manual  []w/heat    []  Ultrasound: []Continuous   [] Pulsed                       at: []1MHz   []3MHz Location:  W/cm2:    [] Paraffin         Location:   []w/heat     15 [x]  Ice     [x]  Heat  []  Ice massage Position: supine 90/90  Location: back and stomach    []  Laser  []  Other: Position:  Location:   15   [x]  Vasopneumatic Device Pressure:       [] lo [x] med [] hi   Temperature: 34     [x] Skin assessment post-treatment:  [x]intact []redness- no adverse reaction    []redness  adverse reaction:     30 min Neuromuscular Re-education:  [x]  See flow sheet :   Rationale: improve coordination, improve balance and increase proprioception  to improve the patients ability to sit, stand, transfer, ambulate, lift, carry, reach, complete ADLs    30 min Manual Therapy:  rib pumping, infraclavicular pumping, L AIC technique, 2 person central diaphragm release, MFR to diaphragm and rectus abs (L), MFR to R QL and QL stretching.    Rationale: decrease pain, increase ROM, increase tissue extensibility and decrease trigger points to improve the patients ability to sit, stand, transfer, ambulate, lift, carry, reach, complete ADLs      With   [] TE   [] TA   [x] Neuro   [] SC   [] other: Patient Education: [x] Review HEP    [] Progressed/Changed HEP based on:   [x] positioning   [x] body mechanics   [] transfers   [x] heat/ice application    [] other:      Other Objective/Functional Measures:   SLR R 90 L 80  HG IR 90 deg B  Shoulder flexion R 180 L 170  Subclavius: Tight and painful B  Cervical side bend WNL  Cervical rotation WNL  Cervical extension Limited  Apical Expansion  HAdDT - B  HAdLT L L1 R L2  Post med expansion slightly limited B    Pt able to inhale and exhale without RA engagement with verbal cues      Pain Level (0-10 scale) post treatment: less pain    ASSESSMENT/Changes in Function:     Patient will continue to benefit from skilled PT services to modify and progress therapeutic interventions, address functional mobility deficits, address ROM deficits, address strength deficits, analyze and address soft tissue restrictions, analyze and cue movement patterns, analyze and modify body mechanics/ergonomics, assess and modify postural abnormalities, address imbalance/dizziness and instruct in home and community integration to attain remaining goals. []  See Plan of Care  []  See progress note/recertification  []  See Discharge Summary         Progress towards goals / Updated goals:   Improved activation of IO/TA vs RA.     PLAN   [x]  Upgrade activities as tolerated     [x]  Continue plan of care  [x]  Update interventions per flow sheet       []  Discharge due to:_  []  Other:_      Haley Jean, PT 1/4/2022

## 2022-01-12 ENCOUNTER — HOSPITAL ENCOUNTER (OUTPATIENT)
Dept: PHYSICAL THERAPY | Age: 32
Discharge: HOME | End: 2022-01-12
Payer: COMMERCIAL

## 2022-01-12 PROCEDURE — 97016 VASOPNEUMATIC DEVICE THERAPY: CPT | Performed by: PHYSICAL THERAPIST

## 2022-01-12 PROCEDURE — 97112 NEUROMUSCULAR REEDUCATION: CPT | Performed by: PHYSICAL THERAPIST

## 2022-01-12 PROCEDURE — 97140 MANUAL THERAPY 1/> REGIONS: CPT | Performed by: PHYSICAL THERAPIST

## 2022-01-12 NOTE — PROGRESS NOTES
PT DAILY TREATMENT NOTE - [facility] 2-15    Patient Name: Sravan Murrieta  Date:2022  : 1990  [x]  Patient  Verified  Payor: Anjelica Fabrizio / Plan: [address] [place] [address] / Product Type: PPO /    In time: 220 PM Out Time (min):325 PM  Total Time (min): 65  Total Timed Codes (min): 50  1:1 Treatment Time ( only): 50  Visit #:  24    Treatment Area: Headache with orthostatic component, not elsewhere classified [R51.0]    SUBJECTIVE  Pain Level (0-10 scale): 5/10  Any medication changes, allergies to medications, adverse drug reactions, diagnosis change, or new procedure performed?: [x] No    [] Yes (see summary sheet for update)  Subjective functional status/changes:   [] No changes reported  Pt reports she has a lot of pressure in her chest when she does the lat stretch  \"I am noticing that I get dizzy when I do the vision\"     OBJECTIVE    Modality rationale: decrease pain and increase tissue extensibility to improve the patients ability to sit, stand, transfer, ambulate, lift, carry, reach, complete ADLs   Min Type Additional Details       [] Estim: []Att   []Unatt    []TENS instruct                  []IFC  []Premod   []NMES                     []Other:  []w/US   []w/ice   []w/heat  Position:  Location:       []  Traction: [] Cervical       []Lumbar                       [] Prone          []Supine                       []Intermittent   []Continuous Lbs:  [] before manual  [] after manual  []w/heat    []  Ultrasound: []Continuous   [] Pulsed                       at: []1MHz   []3MHz Location:  W/cm2:    [] Paraffin         Location:   []w/heat     15 [x]  Ice     [x]  Heat  []  Ice massage Position: supine 90/90  Location: back and stomach    []  Laser  []  Other: Position:  Location:   15   [x]  Vasopneumatic Device Pressure:       [] lo [x] med [] hi   Temperature: 34     [x] Skin assessment post-treatment:  [x]intact []redness- no adverse reaction    []redness  adverse reaction:     30 min Neuromuscular Re-education:  [x]  See flow sheet :   Rationale: improve coordination, improve balance and increase proprioception  to improve the patients ability to sit, stand, transfer, ambulate, lift, carry, reach, complete ADLs    20 min Manual Therapy:  rib pumping, infraclavicular pumping, L AIC technique, 2 person central diaphragm release, MFR to diaphragm and rectus abs (L), MFR to R QL and QL stretching. Rationale: decrease pain, increase ROM, increase tissue extensibility and decrease trigger points to improve the patients ability to sit, stand, transfer, ambulate, lift, carry, reach, complete ADLs      With   [] TE   [] TA   [x] Neuro   [] SC   [] other: Patient Education: [x] Review HEP    [] Progressed/Changed HEP based on:   [x] positioning   [x] body mechanics   [] transfers   [x] heat/ice application    [] other:      Other Objective/Functional Measures:   SLR R 90 L 80 NT  HG IR 90 deg B  Shoulder flexion R 180 L 170  Subclavius: Tight and painful B  Cervical side bend WNL  Cervical rotation WNL  Cervical extension Limited  Apical Expansion Good  HAdDT - B NT  HAdLT L L1 R L2 NT   Post med expansion slightly limited B      Pain Level (0-10 scale) post treatment: less pain    ASSESSMENT/Changes in Function:     Patient will continue to benefit from skilled PT services to modify and progress therapeutic interventions, address functional mobility deficits, address ROM deficits, address strength deficits, analyze and address soft tissue restrictions, analyze and cue movement patterns, analyze and modify body mechanics/ergonomics, assess and modify postural abnormalities, address imbalance/dizziness and instruct in home and community integration to attain remaining goals. []  See Plan of Care  []  See progress note/recertification  []  See Discharge Summary         Progress towards goals / Updated goals:   Great tolerance of new stretch for R IC wall and supine bar reach.     PLAN   [x]  Upgrade activities as tolerated     [x]  Continue plan of care  [x]  Update interventions per flow sheet       []  Discharge due to:_  []  Other:_      Elizabeth Hogan, PT 1/12/2022

## 2022-01-18 ENCOUNTER — APPOINTMENT (OUTPATIENT)
Dept: PHYSICAL THERAPY | Age: 32
End: 2022-01-18
Payer: COMMERCIAL

## 2022-01-18 ENCOUNTER — OFFICE VISIT (OUTPATIENT)
Dept: NEUROLOGY | Age: 32
End: 2022-01-18
Payer: COMMERCIAL

## 2022-01-18 VITALS
BODY MASS INDEX: 24.99 KG/M2 | WEIGHT: 150 LBS | TEMPERATURE: 97.8 F | SYSTOLIC BLOOD PRESSURE: 102 MMHG | HEIGHT: 65 IN | OXYGEN SATURATION: 98 % | HEART RATE: 101 BPM | DIASTOLIC BLOOD PRESSURE: 87 MMHG

## 2022-01-18 DIAGNOSIS — G43.709 CHRONIC MIGRAINE WITHOUT AURA WITHOUT STATUS MIGRAINOSUS, NOT INTRACTABLE: Primary | ICD-10-CM

## 2022-01-18 PROCEDURE — 99244 OFF/OP CNSLTJ NEW/EST MOD 40: CPT | Performed by: PSYCHIATRY & NEUROLOGY

## 2022-01-18 RX ORDER — BUTALBITAL, ASPIRIN, AND CAFFEINE 325; 50; 40 MG/1; MG/1; MG/1
CAPSULE ORAL
COMMUNITY
Start: 2020-10-01

## 2022-01-18 RX ORDER — FREMANEZUMAB-VFRM 225 MG/1.5ML
225 INJECTION SUBCUTANEOUS
Qty: 1.5 ML | Refills: 5 | Status: SHIPPED | OUTPATIENT
Start: 2022-01-18 | End: 2022-03-02

## 2022-01-18 RX ORDER — SUMATRIPTAN 50 MG/1
50 TABLET, FILM COATED ORAL
Qty: 9 TABLET | Refills: 2 | Status: SHIPPED | OUTPATIENT
Start: 2022-01-18 | End: 2022-01-18

## 2022-01-18 RX ORDER — DULOXETIN HYDROCHLORIDE 30 MG/1
CAPSULE, DELAYED RELEASE ORAL
COMMUNITY
Start: 2020-11-11

## 2022-01-18 RX ORDER — CYCLOBENZAPRINE HCL 10 MG
TABLET ORAL
COMMUNITY
Start: 2021-12-18

## 2022-01-18 RX ORDER — DULOXETIN HYDROCHLORIDE 60 MG/1
CAPSULE, DELAYED RELEASE ORAL
COMMUNITY
Start: 2019-01-01
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
## 2022-01-19 ENCOUNTER — HOSPITAL ENCOUNTER (OUTPATIENT)
Dept: PHYSICAL THERAPY | Age: 32
Discharge: HOME | End: 2022-01-19
Payer: COMMERCIAL

## 2022-01-19 PROCEDURE — 97016 VASOPNEUMATIC DEVICE THERAPY: CPT | Performed by: PHYSICAL THERAPIST

## 2022-01-19 PROCEDURE — 97140 MANUAL THERAPY 1/> REGIONS: CPT | Performed by: PHYSICAL THERAPIST

## 2022-01-19 PROCEDURE — 97112 NEUROMUSCULAR REEDUCATION: CPT | Performed by: PHYSICAL THERAPIST

## 2022-02-10 ENCOUNTER — TELEPHONE (OUTPATIENT)
Dept: NEUROLOGY | Age: 32
End: 2022-02-10

## 2022-02-17 ENCOUNTER — TELEPHONE (OUTPATIENT)
Dept: NEUROLOGY | Age: 32
End: 2022-02-17

## 2022-02-17 NOTE — TELEPHONE ENCOUNTER
Pt is still waiting for the PA for her Ajovy. Patient said she talked with her insurance and was told they haven't received anything yet.

## 2022-02-17 NOTE — TELEPHONE ENCOUNTER
Message via [employer]   \" Prior authorization for her medication, has it been submitted. \" please call back with update.

## 2022-02-23 NOTE — TELEPHONE ENCOUNTER
Re: Rudi Jiang case on [facility]    EOC# [id number]    Submitted and awaiting update.

## 2022-03-02 RX ORDER — ERENUMAB-AOOE 70 MG/ML
70 INJECTION SUBCUTANEOUS
Qty: 1 EACH | Refills: 5 | Status: SHIPPED | OUTPATIENT
Start: 2022-03-02 | End: 2022-03-03 | Stop reason: SDUPTHER

## 2022-03-02 NOTE — TELEPHONE ENCOUNTER
Re: [employer] Collet    Followed up and see Lind[employer] Collet denied, provider has now sent in [address], see PA was created in [facility] but need to submit via [employer] [facility] [place],     Created EOC# [id number]    Submitted and awaiting update. Allow 24 hrs.

## 2022-03-03 RX ORDER — ERENUMAB-AOOE 70 MG/ML
70 INJECTION SUBCUTANEOUS
Qty: 1 EACH | Refills: 5 | Status: SHIPPED | OUTPATIENT
Start: 2022-03-03

## 2022-03-03 NOTE — TELEPHONE ENCOUNTER
Re: Annette EUBANKS approval for med, effective 03/02/22-03/02/23, scanned letter to chart. Sent my-chart message to pt to advise.

## 2022-03-15 ENCOUNTER — TELEPHONE (OUTPATIENT)
Dept: NEUROLOGY | Age: 32
End: 2022-03-15

## 2022-03-15 NOTE — TELEPHONE ENCOUNTER
Pt is requesting that her last office visit notes to be sent to [facility], can they be faxed to [phone].

## 2022-03-24 NOTE — ANCILLARY DISCHARGE INSTRUCTIONS
Physical Therapy at [facility],   a part of [address]  [address] [place] [address], [address] [place] [address] Drive  Phone: [phone]  Fax: [phone]    Discharge Summary  2-15    Patient name: Junaid Andrade  : 1990  Provider#: [id number]  Referral source: Tatianna Pizano MD      Medical/Treatment Diagnosis: Headache with orthostatic component, not elsewhere classified [R51.0]     Prior Hospitalization: see medical history     Comorbidities: See Plan of Care  Prior Level of Function:See Plan of Care  Medications: Verified on Patient Summary List    Start of Care: 21      Onset Date:2020   Visits from Start of Care: 25     Missed Visits: 0  Reporting Period : 21 to 22      ASSESSMENT/SUMMARY OF CARE: The patient has completed 25 PT visits between 21 and 22. She had excellent attendance and a pleasant demeanor. She was treated with a combination of manual therapy, therapeutic exercise, neuromuscular re-education, patient education, home exercise program, and self care recommendations. Postural [employer]oration [place] techniques were used to dysfunctional breathing mechanics and restricted rib cage mobility. She had temporary relief of symptoms using these techniques, but was unable to independently manage her symptoms. Specific treatments that provided short term relief included vaso pneumatic compression of the head/neck region using the game ready with the head and neck attachment on low pressure, OVI manual techniques to address underlying L AIC, R BC, R TMCC patterning, and specific OVI non manual techniques which incorporated lateral gaze. Some objective improvements were noted including the ability to breathe without recruiting rectus abdominus as well as the ability to recruit and sense IO/TA, hamstrings, and adductors.   The patient's progress was slow and limited secondary to stress at work and in her personal life and history of post concussion syndrome. The patient tried many alternative treatments alongside PT with mixed results including vision therapy, chiropractic care, massage therapy, dry needling, and visceral manipulation but like PT, intervention either was ineffective or did not result in lasting change. I do believe if the patient is able to reduce external stressors, which may include stopping work temporarily, that she may get better control over her symptoms and improve her QOL.       RECOMMENDATIONS:  [x]Discontinue therapy: []Patient has reached or is progressing toward set goals      []Patient is non-compliant or has abdicated      []Due to lack of appreciable progress towards set goals      [x]Other: Pt is moving to [facility] 3/24/2022
